# Patient Record
Sex: FEMALE | Race: WHITE | NOT HISPANIC OR LATINO | Employment: OTHER | URBAN - METROPOLITAN AREA
[De-identification: names, ages, dates, MRNs, and addresses within clinical notes are randomized per-mention and may not be internally consistent; named-entity substitution may affect disease eponyms.]

---

## 2017-05-17 DIAGNOSIS — I63.9 CVA (CEREBRAL VASCULAR ACCIDENT): Primary | ICD-10-CM

## 2017-05-29 ENCOUNTER — CLINICAL SUPPORT (OUTPATIENT)
Dept: REHABILITATION | Facility: HOSPITAL | Age: 77
End: 2017-05-29
Attending: INTERNAL MEDICINE
Payer: MEDICARE

## 2017-05-29 DIAGNOSIS — I69.398 ABNORMALITY OF GAIT AS LATE EFFECT OF CEREBROVASCULAR ACCIDENT (CVA): ICD-10-CM

## 2017-05-29 DIAGNOSIS — R26.89 IMPAIRED BALANCE AS LATE EFFECT OF CEREBROVASCULAR ACCIDENT: ICD-10-CM

## 2017-05-29 DIAGNOSIS — I69.398 IMPAIRED BALANCE AS LATE EFFECT OF CEREBROVASCULAR ACCIDENT: ICD-10-CM

## 2017-05-29 DIAGNOSIS — R26.9 ABNORMALITY OF GAIT AS LATE EFFECT OF CEREBROVASCULAR ACCIDENT (CVA): ICD-10-CM

## 2017-05-29 PROCEDURE — G8979 MOBILITY GOAL STATUS: HCPCS | Mod: CJ,PO

## 2017-05-29 PROCEDURE — G8978 MOBILITY CURRENT STATUS: HCPCS | Mod: CK,PO

## 2017-05-29 PROCEDURE — 97161 PT EVAL LOW COMPLEX 20 MIN: CPT | Mod: PO

## 2017-05-29 NOTE — PROGRESS NOTES
OUTPATIENT NEUROLOGICAL REHABILITATION  PHYSICAL THERAPY EVALUATION    Name: Alba Vasquez  Clinic Number: [id number]    Diagnosis:   Encounter Diagnoses   Name Primary?    Abnormality of gait as late effect of cerebrovascular accident (CVA)     Impaired balance as late effect of cerebrovascular accident      Physician: Garry Bennett MD  Treatment Orders: PT Eval and Treat  No past medical history on file.    Evaluation Date: 5/29/17  Visit #: 1  Plan of care expiration: 7/23/17  Precautions: standard; aphasic    History     Medical Diagnosis: s/p CVA   PT Diagnosis: impaired gait and balance s/p CVA    History of Present Illness: Alba is a 76 y.o. female that presents to [facility] secondary to CVA Feb 2017.     Chief complaints:  1. R side hemiparesis  2. R leg feels weak   3. Runs into walls on right side   4. Would like to start fitness program at [employer] if able after therapy if safe.  5. Speech impairment most limiting - aphasia      Falls in the past year: None  Prior Therapy: Home health PT immediately after CVA  Nutrition:  Normal  Social History/Support systems: Lives alone  Place of Residence (Steps/Adaptations/Levels): Has a flight of stairs but does not utilize upstairs  Previous functional status includes: Independent  Current functional status:  Tima  Exercise routine: Not doing HHPT HEP anymore  DME owned: none    Subjective   Pt stated goals: Resume prior life  Pain: 0/10    Objective   - Follows commands: 100% of time   - Speech: aphasia    Mental status: alert, oriented to person, place, and time  Appearance: Casually dressed  Behavior:  calm and cooperative  Attention Span and Concentration:  Normal    Dominant hand:  right     Posture Alignment in sitting: neutral  Posture Alignment in standing: neutral    Sensation: Light Touch: Intact         Tone: slight increase    Visual/Auditory: R eye hx cataract issues - seeing eye doctor this  month  Tracking:Intact  R/L discrimination: Intact  Visual field: Intact    Coordination:   - LE coordination:  Mild impairment    RANGE OF MOTION--LOWER EXTREMITIES  (R) LE Hip: normal   Knee: normal   Ankle: normal    (L) LE: Hip: normal   Knee: normal   Ankle: normal    Strength: manual muscle test grades below   Lower Extremity Strength  Right LE  Left LE    Hip Flexion: 4/5 Hip Flexion: 5/5   Hip Abduction: 5/5 Hip Abduction: 5/5   Hip Adduction: 5/5 Hip Adduction 5/5   Knee Extension: 5/5 Knee Extension: 5/5   Knee Flexion: 5/5 Knee Flexion: 5/5   Ankle Dorsiflexion: 5/5 Ankle Dorsiflexion: 5/5   Ankle Plantarflexion: 5/5 Ankle Plantarflexion: 5/5      Evaluation   Single Limb Stance R LE  5s  (<10 sec = HIGH FALL RISK)   Single Limb Stance L LE 5s  (<10 sec = HIGH FALL RISK)   30 second Chair Rise 10 completed with no arms     Gait Assessment:   - AD used: None  - Assistance: Tima  - Distance: 300+ feet     GAIT DEVIATIONS:  Alba displays the following deviations with ambulation: slowed gait  Impairments contributing to deviations: impaired motor control    Endurance Deficit: mild     Evaluation   Timed Up and Go 10 sec   Self Selected Walking Speed 1.0 m/sec (6m/6s)   ABC  96.3%     Functional Gait Assessment:   1. Gait on level surface =  3  2. Change in Gait Speed = 2  3. Gait with horizontal head turns  = 2  4. Gait with vertical head turns = 2  5. Gait with pivot turns = 3  6. Step over obstacle = 2  7. Gait with Narrow WILLY = 2  8. Gait with eyes closed = 2  9. Ambulating Backwards = 2  10. Steps = 2   Score 22/30     Functional Mobility (Bed mobility, transfers)  Bed mobility: I  Supine to sit: I  Sit to supine: I  Transfers to bed: I  Transfers to toilet: I  Sit to stand:  I  Stand pivot:  I  Car transfers: Mod I  Wheelchair mobility: n/a    Functional Limitations Reports - G Codes  Category: Mobility   Tool: CMS Impairment/Limitation/Restriction for FOTO Cerebrovascular Disorders Survey  Status  Limitation G-Code CMS Severity Modifier  Intake 60% 40% Current Status CK - At least 40 percent but less than 60 percent  Predicted 66% 34% Goal Status+ CJ - At least 20 percent but less than 40 percent          Education provided re:role of PT, goals for PT, scheduling - pt verbalized understanding. Discussed insurance limitations with pt.     Alba verbalized good understanding of education provided.   Pt has no cultural, educational or language barriers to learning provided.      Assessment   This is a 76 y.o. female referred to outpatient physical therapy and presents with a medical diagnosis of CVA.  Patient presents with mild R LE coordination impairments, balance impairments and endurance impairments s/p her CVA which is affecting her gait performance and activity tolerance for IADLs/community mobility. PT is warranted to address the deficits in order to return her to her PLOF. She is more affected by her R UE impairments for which she will be seeing OT later this week. She would also benefit from outpatient SLP which she was going to call her MD to request orders.      PT/PTA met face to face to discuss pt's treatment plan and established goals. Pt will be seen by physical therapy every 6th visit and minimally once per month.     Pt rehab potential is Good. Pt will benefit from continuing skilled outpatient physical therapy to address the deficits listed below in the problem list, provide pt/family education and to maximize pt's level of independence in the home and community environment.     History  Co-morbidities and personal factors that may impact the plan of care Examination  Body Structures and Functions, activity limitations and participation restrictions that may impact the plan of care. Medical necessity is demonstrated by the following impairments. Clinical Presentation Decision Making/ Complexity Score   1. Lives alone, minimal social  1. Impaired gait   2. Impaired balance  3. Impaired  coordination  4. L UE impairments  5. aphasia stable    moderate high low low       Pt's spiritual, cultural and educational needs considered and pt agreeable to plan of care and goals as stated below:     GOALS:   Short term goals: 4 weeks, pt agrees to goals set.  1. Pt will improve SLS time on R to at least 10 seconds for decreased fall risk.   2. Pt will improve SLS time on L to at least 10 seconds for decreased fall risk.   3. Pt will improve FGA score to at least 25/30 for increased safety with community ambulation.       Long term goals: 8 weeks, pt agrees to goals set  1. Pt will improve FGA score to at least 28/30 for increased safety with community ambulation.   2. Pt will improve SSWS to at least 1.2m/s for improved community ambulation.   3. Pt will improve FOTO score to at least 66% for improved self perception of functional mobility.      Plan   Outpatient physical therapy 1 time  weekly to include: Pt Education, HEP, therapeutic exercises, neuromuscular re-education, therapeutic activities, manual therapy, joint mobilizations, aquatic therapy and modalities PRN to achieve established goals. Pt may be seen by PTA as part of the rehabilitation team.     Cont PT for up to 8 weeks.     Michelle Haider, PT  05/29/2017

## 2017-05-29 NOTE — PLAN OF CARE
OUTPATIENT NEUROLOGICAL REHABILITATION  PHYSICAL THERAPY EVALUATION    Name: Alba Vasquez  Clinic Number: [id number]    Diagnosis:   Encounter Diagnoses   Name Primary?    Abnormality of gait as late effect of cerebrovascular accident (CVA)     Impaired balance as late effect of cerebrovascular accident      Physician: Garry Bennett MD  Treatment Orders: PT Eval and Treat  No past medical history on file.    Evaluation Date: 5/29/17  Visit #: 1  Plan of care expiration: 7/23/17  Precautions: standard; aphasic    History     Medical Diagnosis: s/p CVA   PT Diagnosis: impaired gait and balance s/p CVA    History of Present Illness: Alba is a 76 y.o. female that presents to [facility] secondary to CVA Feb 2017.     Chief complaints:  1. R side hemiparesis  2. R leg feels weak   3. Runs into walls on right side   4. Would like to start fitness program at [employer] if able after therapy if safe.  5. Speech impairment most limiting - aphasia      Falls in the past year: None  Prior Therapy: Home health PT immediately after CVA  Nutrition:  Normal  Social History/Support systems: Lives alone  Place of Residence (Steps/Adaptations/Levels): Has a flight of stairs but does not utilize upstairs  Previous functional status includes: Independent  Current functional status:  Tima  Exercise routine: Not doing HHPT HEP anymore  DME owned: none    Subjective   Pt stated goals: Resume prior life  Pain: 0/10    Objective   - Follows commands: 100% of time   - Speech: aphasia    Mental status: alert, oriented to person, place, and time  Appearance: Casually dressed  Behavior:  calm and cooperative  Attention Span and Concentration:  Normal    Dominant hand:  right     Posture Alignment in sitting: neutral  Posture Alignment in standing: neutral    Sensation: Light Touch: Intact         Tone: slight increase    Visual/Auditory: R eye hx cataract issues - seeing eye doctor this  month  Tracking:Intact  R/L discrimination: Intact  Visual field: Intact    Coordination:   - LE coordination:  Mild impairment    RANGE OF MOTION--LOWER EXTREMITIES  (R) LE Hip: normal   Knee: normal   Ankle: normal    (L) LE: Hip: normal   Knee: normal   Ankle: normal    Strength: manual muscle test grades below   Lower Extremity Strength  Right LE  Left LE    Hip Flexion: 4/5 Hip Flexion: 5/5   Hip Abduction: 5/5 Hip Abduction: 5/5   Hip Adduction: 5/5 Hip Adduction 5/5   Knee Extension: 5/5 Knee Extension: 5/5   Knee Flexion: 5/5 Knee Flexion: 5/5   Ankle Dorsiflexion: 5/5 Ankle Dorsiflexion: 5/5   Ankle Plantarflexion: 5/5 Ankle Plantarflexion: 5/5      Evaluation   Single Limb Stance R LE  5s  (<10 sec = HIGH FALL RISK)   Single Limb Stance L LE 5s  (<10 sec = HIGH FALL RISK)   30 second Chair Rise 10 completed with no arms     Gait Assessment:   - AD used: None  - Assistance: Tima  - Distance: 300+ feet     GAIT DEVIATIONS:  Alba displays the following deviations with ambulation: slowed gait  Impairments contributing to deviations: impaired motor control    Endurance Deficit: mild     Evaluation   Timed Up and Go 10 sec   Self Selected Walking Speed 1.0 m/sec (6m/6s)   ABC  96.3%     Functional Gait Assessment:   1. Gait on level surface =  3  2. Change in Gait Speed = 2  3. Gait with horizontal head turns  = 2  4. Gait with vertical head turns = 2  5. Gait with pivot turns = 3  6. Step over obstacle = 2  7. Gait with Narrow WILLY = 2  8. Gait with eyes closed = 2  9. Ambulating Backwards = 2  10. Steps = 2   Score 22/30     Functional Mobility (Bed mobility, transfers)  Bed mobility: I  Supine to sit: I  Sit to supine: I  Transfers to bed: I  Transfers to toilet: I  Sit to stand:  I  Stand pivot:  I  Car transfers: Mod I  Wheelchair mobility: n/a    Functional Limitations Reports - G Codes  Category: Mobility   Tool: CMS Impairment/Limitation/Restriction for FOTO Cerebrovascular Disorders Survey  Status  Limitation G-Code CMS Severity Modifier  Intake 60% 40% Current Status CK - At least 40 percent but less than 60 percent  Predicted 66% 34% Goal Status+ CJ - At least 20 percent but less than 40 percent          Education provided re:role of PT, goals for PT, scheduling - pt verbalized understanding. Discussed insurance limitations with pt.     Alba verbalized good understanding of education provided.   Pt has no cultural, educational or language barriers to learning provided.      Assessment   This is a 76 y.o. female referred to outpatient physical therapy and presents with a medical diagnosis of CVA.  Patient presents with mild R LE coordination impairments, balance impairments and endurance impairments s/p her CVA which is affecting her gait performance and activity tolerance for IADLs/community mobility. PT is warranted to address the deficits in order to return her to her PLOF. She is more affected by her R UE impairments for which she will be seeing OT later this week. She would also benefit from outpatient SLP which she was going to call her MD to request orders.      PT/PTA met face to face to discuss pt's treatment plan and established goals. Pt will be seen by physical therapy every 6th visit and minimally once per month.     Pt rehab potential is Good. Pt will benefit from continuing skilled outpatient physical therapy to address the deficits listed below in the problem list, provide pt/family education and to maximize pt's level of independence in the home and community environment.     History  Co-morbidities and personal factors that may impact the plan of care Examination  Body Structures and Functions, activity limitations and participation restrictions that may impact the plan of care. Medical necessity is demonstrated by the following impairments. Clinical Presentation Decision Making/ Complexity Score   1. Lives alone, minimal social  1. Impaired gait   2. Impaired balance  3. Impaired  coordination  4. L UE impairments  5. aphasia stable    moderate high low low       Pt's spiritual, cultural and educational needs considered and pt agreeable to plan of care and goals as stated below:     GOALS:   Short term goals: 4 weeks, pt agrees to goals set.  1. Pt will improve SLS time on R to at least 10 seconds for decreased fall risk.   2. Pt will improve SLS time on L to at least 10 seconds for decreased fall risk.   3. Pt will improve FGA score to at least 25/30 for increased safety with community ambulation.       Long term goals: 8 weeks, pt agrees to goals set  1. Pt will improve FGA score to at least 28/30 for increased safety with community ambulation.   2. Pt will improve SSWS to at least 1.2m/s for improved community ambulation.   3. Pt will improve FOTO score to at least 66% for improved self perception of functional mobility.      Plan   Outpatient physical therapy 1 time  weekly to include: Pt Education, HEP, therapeutic exercises, neuromuscular re-education, therapeutic activities, manual therapy, joint mobilizations, aquatic therapy and modalities PRN to achieve established goals. Pt may be seen by PTA as part of the rehabilitation team.     Cont PT for up to 8 weeks.     Michelle Haider, PT  05/29/2017

## 2017-05-31 ENCOUNTER — CLINICAL SUPPORT (OUTPATIENT)
Dept: REHABILITATION | Facility: HOSPITAL | Age: 77
End: 2017-05-31
Attending: INTERNAL MEDICINE
Payer: MEDICARE

## 2017-05-31 DIAGNOSIS — Z78.9 IMPAIRED MOBILITY AND ADLS: ICD-10-CM

## 2017-05-31 DIAGNOSIS — Z74.09 IMPAIRED MOBILITY AND ADLS: ICD-10-CM

## 2017-05-31 DIAGNOSIS — R29.898 DECREASED GRIP STRENGTH OF RIGHT HAND: Primary | ICD-10-CM

## 2017-05-31 DIAGNOSIS — R27.8 DECREASED COORDINATION: ICD-10-CM

## 2017-05-31 PROCEDURE — 97166 OT EVAL MOD COMPLEX 45 MIN: CPT | Mod: PO

## 2017-05-31 PROCEDURE — G8985 CARRY GOAL STATUS: HCPCS | Mod: CJ,PO

## 2017-05-31 PROCEDURE — G8984 CARRY CURRENT STATUS: HCPCS | Mod: CK,PO

## 2017-05-31 NOTE — PROGRESS NOTES
Name: Alba Vasquez  MRN: [id number]   Physician: Garry Bennett MD     Diagnosis:   Encounter Diagnosis   Name Primary?    Impaired mobility and ADLs         Onset date: Feb 2017    Date of service: 05/31/17  Start time: 0900  End time: 0950  Visit: 1  Orders: Eval and Treat    Subjective:  Patient goals: improve handwriting,eating with RUE, overall strength of RUE, and UE dressing   Chief Complaint: speech, RUE weakness, difficulties with eating, limitations in handwriting      Chief Complaint   Patient presents with    OT Initial Evaluation      No past medical history on file.   No current outpatient prescriptions on file.     No current facility-administered medications for this visit.      Precautions: none  Social Hx: Retired ,  lives alone    DME: hand rails in bathroom that were previously installed for     Home access: lives in a home, no stairs for entrance, two story house but lives on the first floor.    Review of patient's allergies indicates:  Allergies not on file    Past treatment includes: HHPT    Precautions:  Pain: 0/10 at rest. 0/10 with movement. Pain established using the Numbers pain scale.   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 [facility]  Job description includes: tutoring at local schools  Driving status: currently driving    Objective  Cognitive Exam  Oriented: Person, Place, Time and Situation  Behaviors: pleasant, cooperative  Follows Commands/attention: Follows multistep  commands  Communication: expressive aphasia, mild receptive aphasia, difficulty with word finding   Memory: intact   Safety awareness/insight to disability: aware of disability  Coping skills/emotional control: Appropriate to situation    Visual/perceptual: cataract surgery last year in R eye  Tracking: intact  Saccades:  intact  Acuity: wears reading glasses  R/L discrimination: intact  Visual field: intact  Motor Planning Praxis: intact  Comments: cataract surgery last year in R eye, Pt reports additional procedure to take place in R eye in future    Physical Exam:    Postural examination/scapula alignment: B Rounded shoulder  Joint integrity: firm end feel  Skin integrity: intact  Edema: none    Sensation: Alba reports periodic numbness in L hand.  Light touch: intact  Sharp/Dull:  intact  Kinesthesia: intact  Proprioception:intact  Temperature: intact    Joint Evaluation AROM  PROM     Left Right Left Right   Shoulder flex 0-180 WFL WFL WFL WFL   Shoulder Abd 0-180 WFL WFL WFL WFL   Shoulder ER 0-90 WFL WFL WFL WFL   Shoulder IR 0-90 WFL WFL WFL WFL   Shoulder Extension 0-80 WFL WFL WFL WFL   Shoulder Horizontal adduction 0-90 WFL WFL WFL WFL   Elbow flex/ext 0-150 WFL WFL WFL WFL   Wrist flex 0-80 WFL WFL WFL WFL   Wrist ext 0-70 WFL WFL WFL WFL   Supination 0-80 WFL WFL WFL WFL   Pronation 0-80 WFL WFL WFL WFL   UD WFL WFL WFL WFL   RD WFL WFL WFL WFL     Fist: able to make fist with B hands, but R hand limited in ROM due to arthritic fingers    Comments: bradykinesia in R hand for fine motor coordination    Strength      Left Right   Shoulder flex G G-   Shoulder abd G G-   Shoulder ER G G-   Shoulder IR G G-   Shoulder Extension G G-   Shoulder Horizontal adduction G G-   Elbow flex G G-   Elbow ext G G-   Wrist flex G G-   Wrist ext G G-   Supination G G-   Pronation G G-   UD G G-   RD G G-        Strength(lbs): R: 25 , 30, 28 = 27.7#   L 60, 55, 42 = 52.3#    Fine motor coordination:  9 hole peg  R  54 sec   L 28 sec    Gross motor coordination:slow and decreased accuracy RUE    Tone:  Modified Bryan Scale:   0 - No increase in muscle tone    Balance:   Static: good  Dynamic sit: good                                 Functional Mobility: Pt reports some difficulty with R hip mobility in bed  Bed mobility: Mod  I  Roll to left: Mod I  Roll to right: Mod I  Supine to sit: Mod I  Sit to supine: Mod I  Transfers to bed: Mod I  Transfers to toilet: Mod I  Car transfers: Mod I      ADL's:  Feeding: Mod I - uses L hand more than R, frustrated with use of R hand, difficulty opening cans( has a manual can opener)  Grooming: Mod I, uses L hand to reach back of head  Hygiene: Mod I  UB Dressing: Mod I, hook in front and turn around for bra  LB Dressing: Mod I   Toileting: Mod I - use of L hand more than R.  Bathing: Mod I - standing, hand rails in shower     IADL's:  Homecare: Mod I - has someone who comes in once a month  Cooking: Mod I - holds heavy pots with L hand  Laundry: Mod I  Yard work: N/A  Use of telephone: Mod I - difficulty with accuracy of touching the screen  Money management: Mod A- daughter help complete bills because Pt unable to sign name  Medication management: Mod I  Handwriting: D - unable to sign name on checks    Comments: Pt favoring use of LUE due to limitations in functional abilities with RUE    G-Code: Approx. 50% impaired  strength and fine motor coordination.  CK carry    TODAY'S TREATMENT    Teatment today included: OT Eval    ASSESSMENT:  OT diagnosis: impaired mobility and ADLs, decreased strength in RUE, impaired speech    Assessment  Alba Vasquez is a 76 y.o. female with a medical diagnosis of CVA in February 2017. Alba was referred to [facility] for functional limitations and impairments in ADLs and IADLs. At time of eval Alba presents with decreased strength, decreased fine and gross motor coordination, and decreased endurance and stamina in RUE, as well as speech impairments.  These decficits/problems have resulted in occupational limitations including, but not limited to, UE dressing, feeding, IADLs, and community participation.  Ms. Vasquez can benefit from outpatient occupational therapy and a home program to address the stated goals to improve impairments and  functional limitations. Treatment will be directed at improve the following impairments. Rehab potential is good.  Encounter Diagnosis   Name Primary?    Impaired mobility and ADLs      PROBLEM LIST/IMPAIRMENTS:   decreased muscle strength, decreased fine and gross motor coordination, and decreased endurance and stamina affecting RUE in ADLs and IADL tasks.    LTG GOALS:  Time frame: 10 weeks  Increase ROM/Strength to perform ADL's and functional activities for UE dressing and feeding  Improve 9 hole peg test by 25 sec with R hand for fastners in dressing  Use of RUE 75% of time for feeding tasks  Legible Signature  Increase R  strength 10# for holding feeding and grooming tools.  G-Code: CJ Carry 20-40% impairment    STG Goals:  Time frame: 5 weeks  Improve 9 hole peg test by 10 sec in R hand  Use of RUE 50% of time for feeding tasks  I in HEP for increased strength of RUE and improved handwriting  Achieve baseline data for speed and accuracy of texting      PLAN:    Patient/caregiver understands and agrees with plan of care.  History Examination Decision Making Complexity Score   Occupational Profile: expanded chart review and personal interview    Medical and Therapy History: CVA, severe OA of the hands,aphasia                 Performance Deficits     Physical: decreased strength in RUE, decreased endurance and stamina, decreased fine and gross coordination RUE      Cognitive  Expressive and receptive aphasia      Psychosocial:  Lives alone, limited social interactions       Clinical decision making of moderate complexity, which includes an analysis of the occupational profile, analysis of date from problem focused assessments, and the consideration of several treatment options. Patient presents with comorbidities that affect occupational performance. Minimal to moderate modifications of tasks or assistance with assessments is necessary to enable completion of evaluation component.  Moderate            Outpatient occupational therapy 2  times weekly for 10 weeks  to include: pt ed, hep, therapeutic exercises, therapeutic activity, ADLs,  neuromuscular re-education, joint mobilizations, modalities prn, certification 5/30/17- 8/11/17    .   Treatment Time: 50 minutes    MIMI Starks  I certify that I was present in the room directing the student in service delivery and guiding them using my skilled judgment. As the co-signing therapist I have reviewed the students documentation and am responsible for the treatment, assessment, and plan.   Mary Stone, OT

## 2017-06-02 DIAGNOSIS — I63.9 CVA (CEREBROVASCULAR ACCIDENT): Primary | ICD-10-CM

## 2017-06-02 DIAGNOSIS — R47.81 SLURRED SPEECH: ICD-10-CM

## 2017-06-05 ENCOUNTER — CLINICAL SUPPORT (OUTPATIENT)
Dept: REHABILITATION | Facility: HOSPITAL | Age: 77
End: 2017-06-05
Attending: INTERNAL MEDICINE
Payer: MEDICARE

## 2017-06-05 DIAGNOSIS — I63.9 CEREBROVASCULAR ACCIDENT (STROKE): ICD-10-CM

## 2017-06-05 DIAGNOSIS — I69.398 ABNORMALITY OF GAIT AS LATE EFFECT OF CEREBROVASCULAR ACCIDENT (CVA): ICD-10-CM

## 2017-06-05 DIAGNOSIS — I69.398 IMPAIRED BALANCE AS LATE EFFECT OF CEREBROVASCULAR ACCIDENT: ICD-10-CM

## 2017-06-05 DIAGNOSIS — Z74.09 IMPAIRED MOBILITY AND ADLS: ICD-10-CM

## 2017-06-05 DIAGNOSIS — R26.9 ABNORMALITY OF GAIT AS LATE EFFECT OF CEREBROVASCULAR ACCIDENT (CVA): ICD-10-CM

## 2017-06-05 DIAGNOSIS — R26.89 IMPAIRED BALANCE AS LATE EFFECT OF CEREBROVASCULAR ACCIDENT: ICD-10-CM

## 2017-06-05 DIAGNOSIS — R47.81 SLURRED SPEECH: Primary | ICD-10-CM

## 2017-06-05 DIAGNOSIS — Z78.9 IMPAIRED MOBILITY AND ADLS: ICD-10-CM

## 2017-06-05 PROCEDURE — 97112 NEUROMUSCULAR REEDUCATION: CPT | Mod: PO

## 2017-06-05 PROCEDURE — 97530 THERAPEUTIC ACTIVITIES: CPT | Mod: PO

## 2017-06-05 PROCEDURE — 97110 THERAPEUTIC EXERCISES: CPT | Mod: PO

## 2017-06-05 NOTE — PATIENT INSTRUCTIONS
"  Ankle Plantar Flexion / Dorsiflexion, Standing        Stand while holding a stable object.    Rise up on toes x 20 reps.   Then rock back on heels x 20 reps.    Repeat 2 rounds of 20 each. 1-2 sessions per day.  (If this becomes easy, start to try heel raises and toe raises without holding but have support within reach.)        Hip Side leg Lift        Holding a chair for balance if needed, keep legs shoulder width apart and toes pointed forward. Swing a leg out to side, keeping knee straight. Do not lean. Repeat using other leg.  Repeat 10 times. Do 2 sets. 1-2 sessions per day.    Single leg balance with support in reach        Stand on one leg in neutral spine without support. Hold up to 30 seconds.  Repeat on other leg.  Do 3-5 repetitions.  1-2 sessions per day.     [email or web address]         Feet Heel-Toe "Tandem", Varied Arm Positions         With eyes open, right foot directly in front of the other, arms out, look straight ahead at a stationary object. Hold up to 30 seconds.  Repeat with left foot in front. Can change arm position for varied difficulty.   Do 3-5 repetitions.  1-2 sessions per day.    Copyright © [employer]. All rights reserved.        HIP: Hamstrings - Short Sitting        Rest leg on raised surface. Keep knee straight. Lift chest and lean forwards towards toes. Hold 30 seconds.  Repeat 3 times. 1-2 sessions per day.     Copyright © [employer]. All rights reserved.            "

## 2017-06-05 NOTE — PROGRESS NOTES
Physical Therapy Progress Note     Name: Alba [facility] Number: [id number]  Diagnosis:   Encounter Diagnoses   Name Primary?    Abnormality of gait as late effect of cerebrovascular accident (CVA)     Impaired balance as late effect of cerebrovascular accident      Physician: Garry Bennett MD  Treatment Orders: PT Eval and Treat  No past medical history on file.    Evaluation Date: 5/29/17  Visit #: 2  Plan of care expiration: 7/23/17  Precautions: standard; aphasic    G codes 2/10    FOTO On ST caseload         Subjective   Pt reports: no new complaints today.   Pain Scale:  None reported    Objective     Patient received individual therapy with activities as follows:     Alba received therapeutic exercises to develop strength and flexibility for 25 minutes including:   Recumbent bike x 8 min, L12 no rest breaks needed. Pt reported medium difficulty.   Heel cord stretches at incline board, 3 x 30 sec B  HS stretches at stairs, 3 x 30 sec B  Standing hip abductions 3 x 10 B, 2 HR  HS curls x 10 B - fair posture  Mini squats x 10 - difficulty keeping instructed posture (leans forwards)    Patient participated in neuromuscular re-education activities to improve: Balance and Coordination for 20 minutes. The following activities were included:   Tandem static stance x 30 sec B - no loss of balance  Heel raises x 20, no HR  Toe raises, 2 x 20, unable with no HR, needed 2 fingers on bar for balance  Tandem walking, intermittent HR needed with mild LOB, x 3 laps  SLS trials 3 x 30 sec trials but intermittent HR needed for balance      Written Home Exercises: heel raises, toe raises, HS stretch, SLS, tandem stance, hip abductions.  (see pt instructions 6/5/17)  Pt demo good understanding of the education provided. Alba demonstrated good return demonstration of activities.     Education provided re: POC, HEP  No spiritual or educational barriers to learning  provided    Pt has no cultural, educational or language barriers to learning provided.    Assessment   Pt tolerated her initial follow up session well. She did well with both strength and balance exercises. She reported most difficulty with the hip abductions for fatigue in the muscles. Her initial HEP was developed and she showed good understanding. Continue tx.         Pt rehab potential is Good. Pt will benefit from continuing skilled outpatient physical therapy to address the deficits listed below in the problem list, provide pt/family education and to maximize pt's level of independence in the home and community environment.      History  Co-morbidities and personal factors that may impact the plan of care Examination  Body Structures and Functions, activity limitations and participation restrictions that may impact the plan of care. Medical necessity is demonstrated by the following impairments. Clinical Presentation Decision Making/ Complexity Score   1. Lives alone, minimal social  1. Impaired gait   2. Impaired balance  3. Impaired coordination  4. L UE impairments  5. aphasia stable     moderate high low low         Pt's spiritual, cultural and educational needs considered and pt agreeable to plan of care and goals as stated below:      GOALS:   Short term goals: 4 weeks, pt agrees to goals set.  1. Pt will improve SLS time on R to at least 10 seconds for decreased fall risk.   2. Pt will improve SLS time on L to at least 10 seconds for decreased fall risk.   3. Pt will improve FGA score to at least 25/30 for increased safety with community ambulation.         Long term goals: 8 weeks, pt agrees to goals set  1. Pt will improve FGA score to at least 28/30 for increased safety with community ambulation.   2. Pt will improve SSWS to at least 1.2m/s for improved community ambulation.   3. Pt will improve FOTO score to at least 66% for improved self perception of functional mobility.         Plan   Continue PT  1x weekly under established Plan of Care, with treatment to include: pt education, HEP, therapeutic exercises, neuromuscular re-education/balance exercises, therapeutic activities, joint mobilizations, and modalities PRN, to work towards established goals. Pt may be seen by PTA to carry out plan of care.     Michelle Haider, PT   06/05/2017

## 2017-06-05 NOTE — PROGRESS NOTES
"Patient:  Alba [facility] #:  [id number]   Date of Note: 06/05/2017   Referring Physician:  Garry Bennett MD  Diagnosis:    Encounter Diagnosis   Name Primary?    Impaired mobility and ADLs         Start Time: 0945  End Time: 1030   Total Time: 45min, 30 min one on one  Visit: 2    Subjective: Pt reported improvement in ability to write and stated, "I wrote a check and it went through."  Pain: 0 out of 10     Objective: Patient seen by OT this session. Treatment  consist of the following:  Therapeutic activities green digigrip individual and composite x 2 min each and pinching/grasping while searching for beans x 15 in pink putty to increase  and pinch strength.  Fine motor coordination with small peg tasks using tweezers to pull out pegs with R UE. Gross motor coordination task using R UE while standing to complete large PVC pipe ax.  Pt. Received HEP for handwriting and began "clock climbers" during session to demonstrate understanding of instructions.  Assessment:  Pt. Demos decreased strength and endurance in R UE during therapeutic activities.  Pt required Min verbal cueing to encourage use of R UE during tasks.  Pt motivated to complete HEP for handwriting to increase independence in money management.  Pt will continue to benefit from skilled OT intervention.    Patient continues to demonstrate limitation with  PROBLEM LIST/IMPAIRMENTS:   decreased muscle strength, decreased fine and gross motor coordination, and decreased endurance and stamina affecting RUE in ADLs and IADL tasks.     LTG GOALS:  Time frame: 10 weeks  Increase ROM/Strength to perform ADL's and functional activities for UE dressing and feeding  Improve 9 hole peg test by 25 sec with R hand for fastners in dressing  Use of RUE 75% of time for feeding tasks  Legible Signature  Increase R  strength 10# for holding feeding and grooming tools.  G-Code: CJ Carry 20-40% impairment     STG Goals:  Time frame: 5 weeks  Improve 9 hole peg " test by 10 sec in R hand  Use of RUE 50% of time for feeding tasks  I in HEP for increased strength of RUE and improved handwriting  Achieve baseline data for speed and accuracy of texting    Plan: Outpatient occupational therapy 2 times weekly for 10 weeks  to include: pt ed, hep, therapeutic exercises, therapeutic activity, ADLs,  neuromuscular re-education, joint mobilizations, modalities prn, certification 5/30/17- 8/11/17    FOTO    CMS Impairment/Limitation/Restriction for FOTO Cerebrovascular Disorders Survey  Status Limitation G-Code CMS Severity Modifier  Intake 56% 44% Current Status CK - At least 40 percent but less than 60 percent  Predicted 69% 31% Goal Status+ CJ - At least 20 percent but less than 40 percent  CMS Impairment/Limitation/Restriction for Stroke IS Physical - Hand Function Survey  Status Limitation G-Code CMS Severity Modifier  Intake 60% 40% Current Status CK - At least 40 percent but less than 60 percent        Barb Santo, OTS  I certify that I was present in the room directing the student in service delivery and guiding them using my skilled judgment. As the co-signing therapist I have reviewed the students documentation and am responsible for the treatment, assessment, and plan.     Mary Stone, OT

## 2017-06-12 ENCOUNTER — CLINICAL SUPPORT (OUTPATIENT)
Dept: REHABILITATION | Facility: HOSPITAL | Age: 77
End: 2017-06-12
Attending: INTERNAL MEDICINE
Payer: MEDICARE

## 2017-06-12 DIAGNOSIS — I69.398 IMPAIRED BALANCE AS LATE EFFECT OF CEREBROVASCULAR ACCIDENT: Primary | ICD-10-CM

## 2017-06-12 DIAGNOSIS — I69.398 ABNORMALITY OF GAIT AS LATE EFFECT OF CEREBROVASCULAR ACCIDENT (CVA): ICD-10-CM

## 2017-06-12 DIAGNOSIS — R26.89 IMPAIRED BALANCE AS LATE EFFECT OF CEREBROVASCULAR ACCIDENT: Primary | ICD-10-CM

## 2017-06-12 DIAGNOSIS — R26.89 IMPAIRED BALANCE AS LATE EFFECT OF CEREBROVASCULAR ACCIDENT: ICD-10-CM

## 2017-06-12 DIAGNOSIS — I69.398 IMPAIRED BALANCE AS LATE EFFECT OF CEREBROVASCULAR ACCIDENT: ICD-10-CM

## 2017-06-12 DIAGNOSIS — R26.9 ABNORMALITY OF GAIT AS LATE EFFECT OF CEREBROVASCULAR ACCIDENT (CVA): ICD-10-CM

## 2017-06-12 DIAGNOSIS — Z74.09 IMPAIRED MOBILITY AND ADLS: ICD-10-CM

## 2017-06-12 DIAGNOSIS — Z78.9 IMPAIRED MOBILITY AND ADLS: ICD-10-CM

## 2017-06-12 PROCEDURE — 97530 THERAPEUTIC ACTIVITIES: CPT | Mod: PO

## 2017-06-12 PROCEDURE — 97110 THERAPEUTIC EXERCISES: CPT | Mod: PO

## 2017-06-12 NOTE — PROGRESS NOTES
Physical Therapy Progress Note       Entered in ERROR. Pt did not realize that she had PT today after OT and left prior to PT session.

## 2017-06-12 NOTE — PROGRESS NOTES
"Patient:  Alba [facility] #:  [id number]   Date of Note: 06/12/2017   Referring Physician:  Garry Bennett MD  Diagnosis:    Encounter Diagnosis   Name Primary?    Impaired mobility and ADLs         Start Time: 1030  End Time: 1115   Total Time: 45min, 30 min one on one  Visit: 3    Subjective: Pt reported improvement in ability to hold eating utensils and stated she was about to successfully use R UE to paint a picture this weekend.  Pain: 0 out of 10     Objective: Patient seen by OT this session. Treatment  consist of the following therapeutic exercises and activities:  Ergometer 5 min forward and 5 min reverse to increase stamina and endurance of B UE. R hand/wrist strengthening using 2# while performing flexion/extension, pronation/supination, and radial/ulnar deviation.  Fine motor coordination with in hand manipulation while picking up and releasing marbles, unscrewing/screwing washers x 5, and picking up dabloons x 20.  Pt received "kite strings" phase of handwriting HEP and completed 5 min of exercises to demonstrate understanding of instructions.  Assessment:  Pt demo increased fine motor coordination during session but still maintains deficits with strength and endurance in R UE during activities.  Pt required Min verbal cueing to encourage use of R UE during tasks.  Pt motivated to complete HEP for handwriting to increase independence in money management.  Pt will continue to benefit from skilled OT intervention.  Patient continues to demonstrate limitation with  PROBLEM LIST/IMPAIRMENTS:   decreased muscle strength, decreased fine and gross motor coordination, and decreased endurance and stamina affecting RUE in ADLs and IADL tasks.     LTG GOALS:  Time frame: 10 weeks  Increase ROM/Strength to perform ADL's and functional activities for UE dressing and feeding  Improve 9 hole peg test by 25 sec with R hand for fastners in dressing  Use of RUE 75% of time for feeding tasks  Legible " Signature  Increase R  strength 10# for holding feeding and grooming tools.  G-Code: CJ Carry 20-40% impairment     STG Goals:  Time frame: 5 weeks  Improve 9 hole peg test by 10 sec in R hand  Use of RUE 50% of time for feeding tasks  I in HEP for increased strength of RUE and improved handwriting  Achieve baseline data for speed and accuracy of texting    Plan: Outpatient occupational therapy 2 times weekly for 10 weeks  to include: pt ed, hep, therapeutic exercises, therapeutic activity, ADLs,  neuromuscular re-education, joint mobilizations, modalities prn, certification 5/30/17- 8/11/17    Barb Santo, MIMI  I certify that I was present in the room directing the student in service delivery and guiding them using my skilled judgment. As the co-signing therapist I have reviewed the students documentation and am responsible for the treatment, assessment, and plan.     Mary Stone, OT

## 2017-06-13 ENCOUNTER — CLINICAL SUPPORT (OUTPATIENT)
Dept: REHABILITATION | Facility: HOSPITAL | Age: 77
End: 2017-06-13
Attending: INTERNAL MEDICINE
Payer: MEDICARE

## 2017-06-13 DIAGNOSIS — R47.01 ANOMIC APHASIA: ICD-10-CM

## 2017-06-13 DIAGNOSIS — I69.322 DYSARTHRIA AS LATE EFFECT OF CEREBROVASCULAR ACCIDENT (CVA): ICD-10-CM

## 2017-06-13 PROCEDURE — G9186 MOTOR SPEECH GOAL STATUS: HCPCS | Mod: CI,PO

## 2017-06-13 PROCEDURE — 92507 TX SP LANG VOICE COMM INDIV: CPT | Mod: PO

## 2017-06-13 PROCEDURE — G8999 MOTOR SPEECH CURRENT STATUS: HCPCS | Mod: CJ,PO

## 2017-06-13 NOTE — PROGRESS NOTES
"Date: 06/13/2017    Start Time:  1345  Stop Time:  1430      OUTPATIENT NEUROLOGICAL REHABILITATION  SPEECH THERAPY EVALUATION    Subjective/History  Onset Date:  February 2017  Primary Diagnosis:  L CVA  Treatment Diagnosis:  Anomic Aphasia, Dysarthria  Referring Provider:  Dr. Garry Jennings  Orders: ST for evaluation and treat  Current Medical History:   Alba Vasquez presents to the [facility] secondary to the diagnosis of L CVA. Pt reports that she was admitted to [facility] for approximately 3 days. Upon discharge, she received home health PT, OT, and ST for approximately 3-4 months.   Past Medical History: No past medical history on file.  Precautions:  fall  Prior Therapy:  HH ST  Pain:   0/10  Nutrition:  Regular diet, thin liquids  Environmental Concerns/Cultural/Spiritual/Developmental/Educational Needs: none  Prior Level of Function: Independent  Social History:  Mrs. Vasquez worked for [employer] as an  prior to her long-term. She reports that she is very organized and that language and communication have always come naturally to her.   Signs of Abuse: No  Functional Deficits Leading to Referral/Nature of Injury:  Word finding difficulty, slurred speech  Patient Therapy Goals:  "speak fluently again"    Objective   Western Aphasia Battery - Revised (WAB-R) was administered to evaluate the patient's receptive and expressive language function. The following results were revealed:    Spontaneous Speech Score: 18 / 20  Auditory Comprehension Score: 9.9 / 10  Repetition Score:   9 /10  Naming and Word Finding Score:  8.1 / 10  Aphasia Quotient (AQ):   90  Aphasia Classification:   Anomic Aphasia    Auditory Comprehension: Mrs. Carusos auditory comprehension is considered WFL. She was able to answer y/n questions of all complexity levels, identify objects and pictures presented to her, and follow " "complex commands. She was able to participate in conversation easily and reported no concerns with her comprehension.     Reading Comprehension: Not formally assessed. Will assess at a future session.     Verbal Expression: Mrs. Fermin verbal expression is considered mildly impaired. She was observed to grope for words when experiencing word finding difficulty. Intermittently, she repeated the first letter or syllable of a word she was trying to find. Most often, she paused significantly before finding the word or saying "I don't know". She was able to name 10 items in a category when completing a word fluency task (norm 15-20) indicating that her word fluency and thought organization is considered impaired. She was able to label 17/20 objects presented with mild groping for words observed. She completed responsive naming and sentence completion tasks with 100% accuracy. Her anomic episodes increase as her utterance length increases and affects her in  conversation most significantly.     Written Expression: Not formally assessed. Will assess at a future session.     Cognition: Not formally assessed. WFL for the purpose of conversation and assessment. No concerns reported.     Motor Speech/Fluency/Voice:  Mrs. Vasquez presents with mild dysarthria c/b imprecise consonants, slow rate of speech, and mild hypernasality. She was approximately 75-80% intelligible with careful listening. She reports that her intelligibility significantly decreases when she is tired. Fluency and voice were subjectively judged to be WFL.     Oral motor exam revealed:    Lingual: deviation upon protrusion to the right side observed. Decreased ROM to R side upon lateralization. ROM improved with verbal cues. Weakness upon protrusion and lateralization. Adequate tongue tip elevation for speech and eating observed.   Labial: decreased ROM on right side upon protrusion and retraction. Slight discoordination when transitioning from protrusion to " "retraction (oo-ee). Adequate strength upon protrusion observed.   Palatal: decreased ROM observed upon repetition of vowel "ah".   Buccal: Adequate for the production of speech and eating. No facial droop or weakness observed.   Diadochokinetic (DDK) rates for rapid repetition of 1 - 3 syllable utterances were not WNL.     Swallowing: No concerns reported. S/s of aspiration reviewed with pt. Pt verbalized understanding.     Hearing: No concerns reported. Will monitor for changes.     Assessment/Impressions    Mrs. Vasuqez presents to [facility] 2[facility] a L CVA. She presents with mild anomic aphasia c/b decreased word finding in conversation and when presented with items. Decreased word fluency was observed. She presents with mild-moderate dysarthria c/b imprecise consonants, slow rate of speech, and mild hypernasality. Speech intelligibility is judged to be approximately 75-80% with careful listening.  Patient will benefit from outpatient neurological rehabilitation speech therapy.    Functional Communication Measure (FCM):   Severity Modifier for Medicare G-Code:  Motor Speech  Current status: FCM: Level 5  - CJ   Projected status:  FCM: Level 6  - CI       Spoken Language Expression  Current status: FCM:Level 6  -  CI   Projected status:  FCM:  Level 7  -           Rehab Potential: good    Short Term Goals (4 weeks):   1. Pt will name 15-20 items in a concrete category to improve thought organization and word fluency across 3 consecutive sessions.   2. Pt will complete word finding tasks with 90% accuracy supervision across 3 consecutive sessions.  3. Pt will complete verbal expression tasks with 90% accuracy supervision across 3 consecutive sessions.   4. Pt will participate in an assessment of reading and writing.   5. Pt will complete oral motor exercises 10-20 per session to improve oral musculature strength and ROM.  6. Pt will utilize motor speech strategies (i.e. " Overarticulation, slow rate of speech, appropriate breath-speech coordination) in speech tasks with 90% accuracy supervision across 3 consecutive sessions.     Long Term Goals (8 weeks):   1. Pt will develop functional, cognitive-linguistic based skills and utilize compensatory strategies to communicate wants and needs effectively to different conversational partners, maintain safety, and participate socially in functional living environment.   2. Pt will develop functional and intelligible speech and utilize compensatory strategies to communicate effectively in functional living environment.     Education: POC was discussed with pt. Patient and family members expressed understanding.     Plan  Certification Period: 6/5/17 to 12/31/17  Plan of Care Certification Period: 6/13/17 to 8/11/17    Recommended Treatment Plan:  Patient will participate in the [facility] program for speech therapy 2 times per week to address her  Communication and Motor Speech deficits, to educate patient and their family, and to participate in a home exercise program.    Other Recommendations: none at this time      Therapist's Name: ARON Peterson, CCC-SLP     Date: 06/13/2017    I certify the need for these services furnished under this plan of treatment and while under my care.  ____________________________________ Physician/Referring Practitioner   Date of Signature

## 2017-06-15 ENCOUNTER — CLINICAL SUPPORT (OUTPATIENT)
Dept: REHABILITATION | Facility: HOSPITAL | Age: 77
End: 2017-06-15
Attending: INTERNAL MEDICINE
Payer: MEDICARE

## 2017-06-15 DIAGNOSIS — Z74.09 IMPAIRED MOBILITY AND ADLS: ICD-10-CM

## 2017-06-15 DIAGNOSIS — R29.898 POOR FINE MOTOR SKILLS: Primary | ICD-10-CM

## 2017-06-15 DIAGNOSIS — Z78.9 IMPAIRED MOBILITY AND ADLS: ICD-10-CM

## 2017-06-15 PROBLEM — G81.90 HEMIPLEGIA: Status: ACTIVE | Noted: 2017-06-15

## 2017-06-15 PROCEDURE — 97530 THERAPEUTIC ACTIVITIES: CPT | Mod: PO

## 2017-06-15 PROCEDURE — 97110 THERAPEUTIC EXERCISES: CPT | Mod: PO

## 2017-06-15 NOTE — PLAN OF CARE
"Date: 06/13/2017    Start Time:  1345  Stop Time:  1430      OUTPATIENT NEUROLOGICAL REHABILITATION  SPEECH THERAPY EVALUATION    Subjective/History  Onset Date:  February 2017  Primary Diagnosis:  L CVA  Treatment Diagnosis:  Anomic Aphasia, Dysarthria  Referring Provider:  Dr. Garry Jennings  Orders: ST for evaluation and treat  Current Medical History:   Alba Vasquez presents to the [facility] secondary to the diagnosis of L CVA. Pt reports that she was admitted to [facility] for approximately 3 days. Upon discharge, she received home health PT, OT, and ST for approximately 3-4 months.   Past Medical History: No past medical history on file.  Precautions:  fall  Prior Therapy:  HH ST  Pain:   0/10  Nutrition:  Regular diet, thin liquids  Environmental Concerns/Cultural/Spiritual/Developmental/Educational Needs: none  Prior Level of Function: Independent  Social History:  Mrs. Vasquez worked for [employer] as an  prior to her longterm. She reports that she is very organized and that language and communication have always come naturally to her.   Signs of Abuse: No  Functional Deficits Leading to Referral/Nature of Injury:  Word finding difficulty, slurred speech  Patient Therapy Goals:  "speak fluently again"    Objective   Western Aphasia Battery - Revised (WAB-R) was administered to evaluate the patient's receptive and expressive language function. The following results were revealed:    Spontaneous Speech Score: 18 / 20  Auditory Comprehension Score: 9.9 / 10  Repetition Score:   9 /10  Naming and Word Finding Score:  8.1 / 10  Aphasia Quotient (AQ):   90  Aphasia Classification:   Anomic Aphasia    Auditory Comprehension: Mrs. Carusos auditory comprehension is considered WFL. She was able to answer y/n questions of all complexity levels, identify objects and pictures presented to her, and follow " "complex commands. She was able to participate in conversation easily and reported no concerns with her comprehension.     Reading Comprehension: Not formally assessed. Will assess at a future session.     Verbal Expression: Mrs. Fermin verbal expression is considered mildly impaired. She was observed to grope for words when experiencing word finding difficulty. Intermittently, she repeated the first letter or syllable of a word she was trying to find. Most often, she paused significantly before finding the word or saying "I don't know". She was able to name 10 items in a category when completing a word fluency task (norm 15-20) indicating that her word fluency and thought organization is considered impaired. She was able to label 17/20 objects presented with mild groping for words observed. She completed responsive naming and sentence completion tasks with 100% accuracy. Her anomic episodes increase as her utterance length increases and affects her in  conversation most significantly.     Written Expression: Not formally assessed. Will assess at a future session.     Cognition: Not formally assessed. WFL for the purpose of conversation and assessment. No concerns reported.     Motor Speech/Fluency/Voice:  Mrs. Vasquez presents with mild dysarthria c/b imprecise consonants, slow rate of speech, and mild hypernasality. She was approximately 75-80% intelligible with careful listening. She reports that her intelligibility significantly decreases when she is tired. Fluency and voice were subjectively judged to be WFL.     Oral motor exam revealed:    Lingual: deviation upon protrusion to the right side observed. Decreased ROM to R side upon lateralization. ROM improved with verbal cues. Weakness upon protrusion and lateralization. Adequate tongue tip elevation for speech and eating observed.   Labial: decreased ROM on right side upon protrusion and retraction. Slight discoordination when transitioning from protrusion to " "retraction (oo-ee). Adequate strength upon protrusion observed.   Palatal: decreased ROM observed upon repetition of vowel "ah".   Buccal: Adequate for the production of speech and eating. No facial droop or weakness observed.   Diadochokinetic (DDK) rates for rapid repetition of 1 - 3 syllable utterances were not WNL.     Swallowing: No concerns reported. S/s of aspiration reviewed with pt. Pt verbalized understanding.     Hearing: No concerns reported. Will monitor for changes.     Assessment/Impressions    Mrs. Vasquez presents to [facility] 2/2 a L CVA. She presents with mild anomic aphasia c/b decreased word finding in conversation and when presented with items. Decreased word fluency was observed. She presents with mild-moderate dysarthria c/b imprecise consonants, slow rate of speech, and mild hypernasality. Speech intelligibility is judged to be approximately 75-80% with careful listening.  Patient will benefit from outpatient neurological rehabilitation speech therapy.    Functional Communication Measure (FCM):   Severity Modifier for Medicare G-Code:  Motor Speech  Current status: FCM: Level 5  - CJ   Projected status:  FCM: Level 6  - CI       Spoken Language Expression  Current status: FCM:Level 6  -  CI   Projected status:  FCM:  Level 7  -           Rehab Potential: good    Short Term Goals (4 weeks):   1. Pt will name 15-20 items in a concrete category to improve thought organization and word fluency across 3 consecutive sessions.   2. Pt will complete word finding tasks with 90% accuracy supervision across 3 consecutive sessions.  3. Pt will complete verbal expression tasks with 90% accuracy supervision across 3 consecutive sessions.   4. Pt will participate in an assessment of reading and writing.   5. Pt will complete oral motor exercises 10-20 per session to improve oral musculature strength and ROM.  6. Pt will utilize motor speech strategies (i.e. " Overarticulation, slow rate of speech, appropriate breath-speech coordination) in speech tasks with 90% accuracy supervision across 3 consecutive sessions.     Long Term Goals (8 weeks):   1. Pt will develop functional, cognitive-linguistic based skills and utilize compensatory strategies to communicate wants and needs effectively to different conversational partners, maintain safety, and participate socially in functional living environment.   2. Pt will develop functional and intelligible speech and utilize compensatory strategies to communicate effectively in functional living environment.     Education: POC was discussed with pt. Patient and family members expressed understanding.     Plan  Certification Period: 6/5/17 to 12/31/17  Plan of Care Certification Period: 6/13/17 to 8/11/17    Recommended Treatment Plan:  Patient will participate in the [facility] program for speech therapy 2 times per week to address her  Communication and Motor Speech deficits, to educate patient and their family, and to participate in a home exercise program.    Other Recommendations: none at this time      Therapist's Name: ARON Peterson, CCC-SLP     Date: 06/13/2017    I certify the need for these services furnished under this plan of treatment and while under my care.  ____________________________________ Physician/Referring Practitioner   Date of Signature

## 2017-06-15 NOTE — PROGRESS NOTES
"Patient:  Alba [facility] #:  [id number]   Date of Note: 06/15/2017   Referring Physician:  Garry Bennett MD  Diagnosis:    Encounter Diagnoses   Name Primary?    Impaired mobility and ADLs     Poor fine motor skills Yes        Start Time: 1030  End Time: 1115   Total Time: 45min, 30 min one on one  Visit: 4  Subjective: Pt reported difficulty opening chip bags at home.  Pt asked to work on scissor cutting tasks in session.  Pain: 0 out of 10. Pt reported some L UE numbness yesterday but did not have any symptoms today.    Objective: Patient seen by OT this session. Treatment consist of the following therapeutic exercises and activities:  Ergometer 5 min forward and 5 min reverse to increase stamina and endurance of B UE. Fine motor coordination with in hand manipulation to complete x 2 sets of small peg puzzles with tweezers and x 3 sets of multi graded scissor cutting activities.  Pt. Completed pinch strengthening exercises with pink putty while searching for x 15 items.  Sustained  with resisted Pronation/supination and flexion/extension with red flexbar to increased B UE strength x 30 each.  Assessment:  Pt required min verbal cues during fine motor coordination peg task to maintain good pinching technique with R hand. Pt is still completing "kite strings" section of HEP for handwriting to increase independence in money management.  Pt will continue to benefit from skilled OT intervention.  Patient continues to demonstrate limitation with  PROBLEM LIST/IMPAIRMENTS:   decreased muscle strength, decreased fine and gross motor coordination, and decreased endurance and stamina affecting RUE in ADLs and IADL tasks.     LTG GOALS:  Time frame: 10 weeks  Increase ROM/Strength to perform ADL's and functional activities for UE dressing and feeding  Improve 9 hole peg test by 25 sec with R hand for fastners in dressing  Use of RUE 75% of time for feeding tasks  Legible Signature  Increase R  strength 10# for " holding feeding and grooming tools.  G-Code: CJ Carry 20-40% impairment     STG Goals:  Time frame: 5 weeks  Improve 9 hole peg test by 10 sec in R hand  Use of RUE 50% of time for feeding tasks  I in HEP for increased strength of RUE and improved handwriting  Achieve baseline data for speed and accuracy of texting    Plan: Outpatient occupational therapy 2 times weekly for 10 weeks  to include: pt ed, hep, therapeutic exercises, therapeutic activity, ADLs,  neuromuscular re-education, joint mobilizations, modalities prn, certification 5/30/17- 8/11/17    Barb Santo, MIMI  I certify that I was present in the room directing the student in service delivery and guiding them using my skilled judgment. As the co-signing therapist I have reviewed the students documentation and am responsible for the treatment, assessment, and plan.     Mary Stone, OT

## 2017-06-19 ENCOUNTER — CLINICAL SUPPORT (OUTPATIENT)
Dept: REHABILITATION | Facility: HOSPITAL | Age: 77
End: 2017-06-19
Attending: INTERNAL MEDICINE
Payer: MEDICARE

## 2017-06-19 DIAGNOSIS — R47.01 ANOMIC APHASIA: ICD-10-CM

## 2017-06-19 DIAGNOSIS — R26.89 IMPAIRED BALANCE AS LATE EFFECT OF CEREBROVASCULAR ACCIDENT: ICD-10-CM

## 2017-06-19 DIAGNOSIS — Z74.09 IMPAIRED MOBILITY AND ADLS: ICD-10-CM

## 2017-06-19 DIAGNOSIS — I69.322 DYSARTHRIA AS LATE EFFECT OF CEREBROVASCULAR ACCIDENT (CVA): ICD-10-CM

## 2017-06-19 DIAGNOSIS — I69.398 ABNORMALITY OF GAIT AS LATE EFFECT OF CEREBROVASCULAR ACCIDENT (CVA): ICD-10-CM

## 2017-06-19 DIAGNOSIS — Z78.9 IMPAIRED MOBILITY AND ADLS: ICD-10-CM

## 2017-06-19 DIAGNOSIS — I69.398 IMPAIRED BALANCE AS LATE EFFECT OF CEREBROVASCULAR ACCIDENT: ICD-10-CM

## 2017-06-19 DIAGNOSIS — R26.9 ABNORMALITY OF GAIT AS LATE EFFECT OF CEREBROVASCULAR ACCIDENT (CVA): ICD-10-CM

## 2017-06-19 DIAGNOSIS — G81.90 HEMIPLEGIA, UNSPECIFIED ETIOLOGY, UNSPECIFIED HEMIPLEGIA LATERALITY, UNSPECIFIED HEMIPLEGIA TYPE: ICD-10-CM

## 2017-06-19 PROCEDURE — 97110 THERAPEUTIC EXERCISES: CPT | Mod: PO

## 2017-06-19 PROCEDURE — 97530 THERAPEUTIC ACTIVITIES: CPT | Mod: PO

## 2017-06-19 PROCEDURE — 97112 NEUROMUSCULAR REEDUCATION: CPT | Mod: PO

## 2017-06-19 NOTE — PROGRESS NOTES
"Patient:  Alba Vasquez  [facility] #:  [id number]   Date of Note: 06/19/2017   Referring Physician:  Garry Bennett MD  Diagnosis:    Encounter Diagnosis   Name Primary?    Impaired mobility and ADLs         Start Time: 1030  End Time: 1115   Total Time: 45min  Visit: 5  Subjective: Pt continues to report occasional numbness in L UE, specifically in the morning hours or after naps.  Pt stated opening chips bags is, "getting better", and check writing is improving.   Pain: 0 out of 10.   Objective: Patient seen by OT this session. Treatment consist of the following therapeutic exercises and activities:  Ergometer 5 min forward and 5 min reverse to increase stamina and endurance of B UE. Green digigrip individual and composite x 2 min in increase B  strength. Sustained  with resisted pronation/supination and flexion/extension with red flexbar to increased B UE strength x 30 each. Fine motor coordination with B UE for in hand manipulation to complete x 2 small pvc pipe tasks. Pt received information on carpal tunnel wrist splint to help with reported L UE numbness while sleeping. Pt ended session by recieving the "loop group" set of handwriting HEP and completed x 2 pages to demonstrate understanding of instructions.    Assessment:  Pt demos improvements in functional pinch/ by reported success with opening chip bags.  Pt required min verbal cues during fine motor coordination tasks to use R hand. Pt verbalized and demonstrated understanding of new HEP section to increase handwriting for independence in money management as she reports it still takes several trials to write checks.  Pt will continue to benefit from skilled OT intervention.  Patient continues to demonstrate limitation with  PROBLEM LIST/IMPAIRMENTS:   decreased muscle strength, decreased fine and gross motor coordination, and decreased endurance and stamina affecting RUE in ADLs and IADL tasks.     LTG GOALS:  Time frame: 10 weeks  Increase " ROM/Strength to perform ADL's and functional activities for UE dressing and feeding  Improve 9 hole peg test by 25 sec with R hand for fastners in dressing  Use of RUE 75% of time for feeding tasks  Legible Signature  Increase R  strength 10# for holding feeding and grooming tools.  G-Code: CJ Carry 20-40% impairment     STG Goals:  Time frame: 5 weeks  Improve 9 hole peg test by 10 sec in R hand  Use of RUE 50% of time for feeding tasks  I in HEP for increased strength of RUE and improved handwriting  Achieve baseline data for speed and accuracy of texting    Plan: Outpatient occupational therapy 2 times weekly for 10 weeks  to include: pt ed, hep, therapeutic exercises, therapeutic activity, ADLs,  neuromuscular re-education, joint mobilizations, modalities prn, certification 5/30/17- 8/11/17    Barb Santo, OTS  I certify that I was present in the room directing the student in service delivery and guiding them using my skilled judgment. As the co-signing therapist I have reviewed the students documentation and am responsible for the treatment, assessment, and plan.     Mary Stone, OT

## 2017-06-22 ENCOUNTER — CLINICAL SUPPORT (OUTPATIENT)
Dept: REHABILITATION | Facility: HOSPITAL | Age: 77
End: 2017-06-22
Attending: INTERNAL MEDICINE
Payer: MEDICARE

## 2017-06-22 DIAGNOSIS — Z78.9 IMPAIRED MOBILITY AND ADLS: Primary | ICD-10-CM

## 2017-06-22 DIAGNOSIS — R47.01 ANOMIC APHASIA: Primary | ICD-10-CM

## 2017-06-22 DIAGNOSIS — I69.322 DYSARTHRIA AS LATE EFFECT OF CEREBROVASCULAR ACCIDENT (CVA): ICD-10-CM

## 2017-06-22 DIAGNOSIS — Z74.09 IMPAIRED MOBILITY AND ADLS: Primary | ICD-10-CM

## 2017-06-22 PROCEDURE — 97110 THERAPEUTIC EXERCISES: CPT | Mod: PO

## 2017-06-22 PROCEDURE — 97530 THERAPEUTIC ACTIVITIES: CPT | Mod: PO

## 2017-06-22 PROCEDURE — 92507 TX SP LANG VOICE COMM INDIV: CPT | Mod: PO

## 2017-06-22 NOTE — PROGRESS NOTES
"Patient:  Alba Vasquez  [facility] #:  [id number]   Date of Note: 06/22/2017   Referring Physician:  Garry Bennett MD  Diagnosis:    Encounter Diagnosis   Name Primary?    Impaired mobility and ADLs Yes        Start Time: 8748-7811 and then [id number]  Pt. Came late so she finished session post speech therapy.    Total Time: 45min  Visit: 6  Subjective: Pt states L hand wants to takeover.   Pain: 0 out of 10.   Objective: Patient seen by OT this session. Treatment consist of the following therapeutic exercises and activities:  Ergometer 5 min forward and 5 min reverse 90 RPM to increase stamina and endurance of B UE.  Fine motor coordination with B UE for in hand manipulation to complete x [employer] pegs and remove with tweezer in R hand. In hand manipulation with colored stones. Participated in pre-writing task of coloring.Pt  Still has the "loop group" set of handwriting HEP to complete for Monday tx session.  Assessment:  Pt fatigues easily with all tasks. .  Pt required min verbal cues during fine motor coordination tasks to use R hand. Pt verbalized and demonstrated understanding of new HEP section to increase handwriting for independence in money management as she reports it still takes several trials to write checks.  Pt will continue to benefit from skilled OT intervention.  Patient continues to demonstrate limitation with  PROBLEM LIST/IMPAIRMENTS:   decreased muscle strength, decreased fine and gross motor coordination, and decreased endurance and stamina affecting RUE in ADLs and IADL tasks.     LTG GOALS:  Time frame: 10 weeks  Increase ROM/Strength to perform ADL's and functional activities for UE dressing and feeding  Improve 9 hole peg test by 25 sec with R hand for fastners in dressing  Use of RUE 75% of time for feeding tasks  Legible Signature  Increase R  strength 10# for holding feeding and grooming tools.  G-Code: CJ Carry 20-40% impairment     STG Goals:  Time frame: 5 weeks  Improve 9 hole peg " test by 10 sec in R hand  Use of RUE 50% of time for feeding tasks  I in HEP for increased strength of RUE and improved handwriting  Achieve baseline data for speed and accuracy of texting    Plan: Outpatient occupational therapy 2 times weekly for 10 weeks  to include: pt ed, hep, therapeutic exercises, therapeutic activity, ADLs,  neuromuscular re-education, joint mobilizations, modalities prn, certification 5/30/17- 8/11/17      Mary Stone, OT

## 2017-06-22 NOTE — PROGRESS NOTES
"OUTPATIENT NEUROLOGICAL REHABILITATION  SPEECH THERAPY PROGRESS NOTE    Date:  06/22/2017    Start Time:  1030  Stop Time:  1115    Subjective/History  Onset Date:  February 2017  Primary Diagnosis:  L CVA  Treatment Diagnosis:  Anomic Aphasia, Dysarthria  Referring Provider:  Dr. Garry Jennings  Orders: ST for evaluation and treat  Current Medical History:   Alba Vasquez presents to the [facility] secondary to the diagnosis of L CVA. Pt reports that she was admitted to [facility] for approximately 3 days. Upon discharge, she received home health PT, OT, and ST for approximately 3-4 months.   Past Medical History: No past medical history on file.  Precautions:  fall  Prior Therapy:  HH ST  Pain:   0/10  Nutrition:  Regular diet, thin liquids  Environmental Concerns/Cultural/Spiritual/Developmental/Educational Needs: none  Prior Level of Function: Independent  Social History:  Mrs. Vasquez worked for [employer] as an  prior to her penitentiary. She reports that she is very organized and that language and communication have always come naturally to her.   Signs of Abuse: No  Functional Deficits Leading to Referral/Nature of Injury:  Word finding difficulty, slurred speech  Patient Therapy Goals:  "speak fluently again"      TIMED  Procedure Time Min.    Start:   Stop:       Start:  Stop:     Start:  Stop:     Start:  Stop:          UNTIMED  Procedure Time Min.   Speech-Language therapy  Start:    Stop:   45    Start:  Stop:      Total Minutes: 45  Total Timed Units: 0  Total Untimed Units: 1  Charges Billed/# of units: 1    Visit #: 2  Date of Evaluation:   Plan of Care Expiration:     Extended POC:   G-CODE  2/10    eval  CJ/CI Motor Speech   update              Progress/Current Status    Subjective:     Pain: 0 /10  Pt was cooperative and pleasant.    Objective:     Current Progress as of 6/22/17:  Short Term " Goals (4 weeks):   1. Pt will name 15-20 items in a concrete category to improve thought organization and word fluency across 3 consecutive sessions.   - Animals: 10; Food: 10    2. Pt will complete word finding tasks with 90% accuracy supervision across 3 consecutive sessions.  - Pt engaged in conversation with mild word finding difficulty present.   - Word finding strategies were discussed such as thinking about the word she is looking for, describe the item, thinking of the first sound or letter in the word.     3. Pt will complete verbal expression tasks with 90% accuracy supervision across 3 consecutive sessions.   - Pt stated word beginning with certain letters with 43% acc IND'ly, 71% acc given min cues.   - Responsive naming task: 80% acc IND'ly with one delayed response.  - Pt answered questions about a paragraph read with 100% acc for word recall.     4. Pt will participate in an assessment of reading and writing. GOAL MET (6/22/17)  - Pt read two paragraphs and answered questions with 100% acc IND'ly.  - Pt stated that she is in a book club and reads novels. She had trouble concentrating while reading the last book and did not finish it. She denied any reading comprehension difficulty.     5. Pt will complete oral motor exercises 10-20 per session to improve oral musculature strength and ROM.  - OME's were demonstrated and a copy was given for home program. Pt completed the ex's with good ability.    6. Pt will utilize motor speech strategies (i.e. Overarticulation, slow rate of speech, appropriate breath-speech coordination) in speech tasks with 90% accuracy supervision across 3 consecutive sessions.   - Mild imprecise articulation noted. Motor speech strategies were discussed.       Long Term Goals (8 weeks):   1. Pt will develop functional, cognitive-linguistic based skills and utilize compensatory strategies to communicate wants and needs effectively to different conversational partners, maintain  safety, and participate socially in functional living environment.   2. Pt will develop functional and intelligible speech and utilize compensatory strategies to communicate effectively in functional living environment.    Assessment:     Mrs. Vasquez presents to [facility] 2/[facility] a L CVA. She presents with mild anomic aphasia c/b decreased word finding in conversation and when presented with items. Decreased word fluency was observed. She presents with mild-moderate dysarthria c/b imprecise consonants, slow rate of speech, and mild hypernasality. Speech intelligibility is judged to be approximately 75-80% with careful listening.  Patient will benefit from outpatient neurological rehabilitation speech therapy.    Functional Communication Measure (FCM):   Severity Modifier for Medicare G-Code:  Motor Speech  Current status: FCM: Level 5  - CJ   Projected status:  FCM: Level 6  - CI       Spoken Language Expression  Current status: FCM:Level 6  -  CI   Projected status:  FCM:  Level 7  -  CH     Rehab Potential: good    Patient Education/Response:     Education: POC was discussed with pt. Patient and family members expressed understanding.    Plans and Goals:     Plan  Certification Period: 6/5/17 to 12/31/17  Plan of Care Certification Period: 6/13/17 to 8/11/17    Continue POC    Recommended Treatment Plan:  Patient will participate in the [facility] program for speech therapy 2 times per week to address her  Communication and Motor Speech deficits, to educate patient and their family, and to participate in a home exercise program.    Other Recommendations: none at this time    ARON Turcios, CCC-SLP  Speech-Language Pathologist  Outpatient Neurological Rehabilitation

## 2017-06-26 ENCOUNTER — CLINICAL SUPPORT (OUTPATIENT)
Dept: REHABILITATION | Facility: HOSPITAL | Age: 77
End: 2017-06-26
Attending: INTERNAL MEDICINE
Payer: MEDICARE

## 2017-06-26 DIAGNOSIS — R26.9 ABNORMALITY OF GAIT AS LATE EFFECT OF CEREBROVASCULAR ACCIDENT (CVA): ICD-10-CM

## 2017-06-26 DIAGNOSIS — Z74.09 IMPAIRED MOBILITY AND ADLS: ICD-10-CM

## 2017-06-26 DIAGNOSIS — I69.398 ABNORMALITY OF GAIT AS LATE EFFECT OF CEREBROVASCULAR ACCIDENT (CVA): ICD-10-CM

## 2017-06-26 DIAGNOSIS — I69.398 IMPAIRED BALANCE AS LATE EFFECT OF CEREBROVASCULAR ACCIDENT: ICD-10-CM

## 2017-06-26 DIAGNOSIS — R47.01 ANOMIC APHASIA: ICD-10-CM

## 2017-06-26 DIAGNOSIS — Z78.9 IMPAIRED MOBILITY AND ADLS: ICD-10-CM

## 2017-06-26 DIAGNOSIS — I69.322 DYSARTHRIA AS LATE EFFECT OF CEREBROVASCULAR ACCIDENT (CVA): ICD-10-CM

## 2017-06-26 DIAGNOSIS — R26.89 IMPAIRED BALANCE AS LATE EFFECT OF CEREBROVASCULAR ACCIDENT: ICD-10-CM

## 2017-06-26 PROCEDURE — 97116 GAIT TRAINING THERAPY: CPT | Mod: PO,59

## 2017-06-26 PROCEDURE — 92507 TX SP LANG VOICE COMM INDIV: CPT | Mod: PO

## 2017-06-26 PROCEDURE — 97110 THERAPEUTIC EXERCISES: CPT | Mod: PO

## 2017-06-26 PROCEDURE — G8980 MOBILITY D/C STATUS: HCPCS | Mod: CJ,PO

## 2017-06-26 PROCEDURE — G8979 MOBILITY GOAL STATUS: HCPCS | Mod: CJ,PO

## 2017-06-26 PROCEDURE — 97112 NEUROMUSCULAR REEDUCATION: CPT | Mod: PO

## 2017-06-26 PROCEDURE — 97530 THERAPEUTIC ACTIVITIES: CPT | Mod: PO

## 2017-06-26 NOTE — PROGRESS NOTES
"OUTPATIENT NEUROLOGICAL REHABILITATION  SPEECH THERAPY PROGRESS NOTE    Date:  06/26/2017    Start Time:  1040  Stop Time:  1120    Subjective/History  Onset Date:  February 2017  Primary Diagnosis:  L CVA  Treatment Diagnosis:  Anomic Aphasia, Dysarthria  Referring Provider:  Dr. Garry Jennings  Orders: ST for evaluation and treat  Current Medical History:   Alba Vasquez presents to the [facility] secondary to the diagnosis of L CVA. Pt reports that she was admitted to [facility] for approximately 3 days. Upon discharge, she received home health PT, OT, and ST for approximately 3-4 months.   Past Medical History: No past medical history on file.  Precautions:  fall        TIMED  Procedure Time Min.    Start:   Stop:       Start:  Stop:     Start:  Stop:     Start:  Stop:          UNTIMED  Procedure Time Min.   Speech and Language therapy Start:  1040  Stop: 1120  40    Start:  Stop:      Total Minutes: 40  Total Timed Units: 0  Total Untimed Units: 1  Charges Billed/# of units: 1    Visit #: 3  Date of Evaluation:   Plan of Care Expiration:     Extended POC:   G-CODE  3/10    eval  CJ/CI Motor Speech   update            Progress/Current Status    Subjective:     Pain: 0 /10  Mrs. Vasquez reports an increased nasality in her speech. She reports "I don't sound like myself".     Objective:   Short Term Goals (4 weeks):   1. Pt will name 15-20 items in a concrete category to improve thought organization and word fluency across 3 consecutive sessions.   - vegetables: 1 IND'ly, 6 with cues    2. Pt will complete word finding tasks with 90% accuracy supervision across 3 consecutive sessions.  - Pt engaged in conversation with moderate word finding difficulty present. Decreased word fluency observed in conversation re: her upcoming trip. Pt reported frustration re: word finding and repeatedly said "See, I know what I want to say but see that?"  - Word finding " "strategies were reviewed (i.e. Association, visualization, description, first letter or sound, and subcategorization)    3. Pt will complete verbal expression tasks with 90% accuracy supervision across 3 consecutive sessions.   - n/a    4. Pt will participate in an assessment of reading and writing. GOAL MET (6/22/17)  - Pt read two paragraphs and answered questions with 100% acc IND'ly.  - Pt stated that she is in a book club and reads novels. She had trouble concentrating while reading the last book and did not finish it. She denied any reading comprehension difficulty.     5. Pt will complete oral motor exercises 10-20 per session to improve oral musculature strength and ROM.  - OME's were completed with min A. Mirror aided in visual feedback.   - palatal massage was demonstrated. Pt reported that her voice "sounds like me" after palatal massage.    6. Pt will utilize motor speech strategies (i.e. Overarticulation, slow rate of speech, appropriate breath-speech coordination) in speech tasks with 90% accuracy supervision across 3 consecutive sessions.   - Mild imprecise articulation noted. Motor speech strategies were discussed.       Long Term Goals (8 weeks):   1. Pt will develop functional, cognitive-linguistic based skills and utilize compensatory strategies to communicate wants and needs effectively to different conversational partners, maintain safety, and participate socially in functional living environment.   2. Pt will develop functional and intelligible speech and utilize compensatory strategies to communicate effectively in functional living environment.    Assessment:     Mrs. Vasquez presents to [facility] 2/2 a L CVA. She presents with mild anomic aphasia c/b decreased word finding in conversation and when presented with items. Decreased word fluency was observed. She presents with mild-moderate dysarthria c/b imprecise consonants, slow rate of speech, and mild hypernasality. " Speech intelligibility is judged to be approximately 75-80% with careful listening.  Patient will benefit from outpatient neurological rehabilitation speech therapy.    Functional Communication Measure (FCM):   Severity Modifier for Medicare G-Code:  Motor Speech  Current status: FCM: Level 5  - CJ   Projected status:  FCM: Level 6  - CI       Spoken Language Expression  Current status: FCM:Level 6  -  CI   Projected status:  FCM:  Level 7  -  CH     Rehab Potential: good    Patient Education/Response:     Pt asked why she had to complete so many oral motor exercises. SLP showed pt a picture of the muscles of the face and explained how each exercise targets one of those muscles. Pt verbalized understanding. Word retrieval strategies were also reviewed. Pt verbalized understanding.     Plans and Goals:     Continue POC.  Certification Period: 6/5/17 to 12/31/17  Plan of Care Certification Period: 6/13/17 to 8/11/17      Recommended Treatment Plan:  Patient will participate in the [facility] program for speech therapy 2 times per week to address her  Communication and Motor Speech deficits, to educate patient and their family, and to participate in a home exercise program.    Other Recommendations: none at this time      ARON Peterson, CCC-SLP  6/26/2017

## 2017-06-26 NOTE — PROGRESS NOTES
Patient:  Alba [facility] #:  [id number]   Date of Note: 06/26/2017   Referring Physician:  Garry Bennett MD  Diagnosis:    Encounter Diagnosis   Name Primary?    Impaired mobility and ADLs         Start Time: 0945  End Time: 1030   Total Time: 45min, 30 min one on one  Visit: 7  Subjective: Pt reports she avoids trying to cut food when eating out due to difficulty holding utensils.  Pain: 0 out of 10.   Objective: Patient seen by OT this session. Treatment consist of the following therapeutic exercises and activities:  Ergometer 5 min forward and 5 min reverse  to increase stamina and endurance of B UE.  In hand manipulation with marbles while picking up/releasing 1 x 1.  Pinch strengthening task while pulling cards 1 x 1 with opposing resistance. Feeding and motor coordination tasks using graded pink and green resistance putty while cutting using built up utensils.  Fine motor coordination task for coloring graded picture with built up pencil .  Pt received information on where to purchase and how to fabricate personal built up .  Assessment:  Pt did well with feeding task today after adjusting to adapted .  Pt recommended to bring personal adapted silverware with her when eating out to encourage participation and use of R hand.  Pt required min verbal cues during fine motor coordination tasks to use R hand. Pt verbalized and demonstrated understanding of adaptations and seemed motivated to try different  at home.  Pt will continue to benefit from skilled OT intervention.  Patient continues to demonstrate limitation with  PROBLEM LIST/IMPAIRMENTS:   decreased muscle strength, decreased fine and gross motor coordination, and decreased endurance and stamina affecting RUE in ADLs and IADL tasks.     LTG GOALS:  Time frame: 10 weeks  Increase ROM/Strength to perform ADL's and functional activities for UE dressing and feeding  Improve 9 hole peg test by 25 sec with R hand for fastners in  dressing  Use of RUE 75% of time for feeding tasks  Legible Signature  Increase R  strength 10# for holding feeding and grooming tools.  G-Code: CJ Carry 20-40% impairment     STG Goals:  Time frame: 5 weeks  Improve 9 hole peg test by 10 sec in R hand  Use of RUE 50% of time for feeding tasks  I in HEP for increased strength of RUE and improved handwriting  Achieve baseline data for speed and accuracy of texting    Plan: Outpatient occupational therapy 2 times weekly for 10 weeks  to include: pt ed, hep, therapeutic exercises, therapeutic activity, ADLs,  neuromuscular re-education, joint mobilizations, modalities prn, certification 5/30/17- 8/11/17    Barb Santo, MIMI  I certify that I was present in the room directing the student in service delivery and guiding them using my skilled judgment. As the co-signing therapist I have reviewed the students documentation and am responsible for the treatment, assessment, and plan.     Mary Stone, OT

## 2017-06-26 NOTE — PROGRESS NOTES
Physical Therapy Progress Note     Name: Alba Vasquez  [facility] Number: [id number]  Diagnosis:   Encounter Diagnoses   Name Primary?    Abnormality of gait as late effect of cerebrovascular accident (CVA)     Impaired balance as late effect of cerebrovascular accident      Physician: Garry Bennett MD  Treatment Orders: PT Eval and Treat  No past medical history on file.    Evaluation Date: 5/29/17  Visit #: 4  Plan of care expiration: 7/23/17  Precautions: standard; aphasic    G codes 4/10    FOTO On ST caseload         Subjective   Pt reports: no new complaints today. Pt states that she has been HEP compliant and that she has been going to the gym 3x/week to ride the recumbent bike.   Pain Scale:  None reported    Objective     Patient received individual therapy with activities as follows:     Patient participated in gait training activities to normalize gait pattern for 10 minutes. The following activities were included:   Gait belt used for safety. Assistive device: none      Gait Assessment:   - AD used: None  - Assistance: Tima  - Distance: 300+ feet      GAIT DEVIATIONS:  Alba displays the following deviations with ambulation: appropriate step length and mary noted compared to initial evaluation  Impairments contributing to deviations: impaired motor control     Endurance Deficit: mild       Evaluation 6/26/17   Timed Up and Go 10 sec 9 sec   Self Selected Walking Speed 1.0 m/sec (6m/6s) 1.2 m/sec (6m/5s)   ABC  96.3% 97.5%          Alba received therapeutic exercises to develop strength and flexibility for 25 minutes including:   Recumbent bike x 10 min, L12 no rest breaks needed. Pt reported minimal difficulty.     Coordination:   - LE coordination:  heel-toe tapping WFL, simultaneous, alternating, fast, and slow     RANGE OF MOTION--LOWER EXTREMITIES  (R) LE Hip: normal                        Knee: normal                        Ankle: normal     (L)  LE: Hip: normal                        Knee: normal                        Ankle: normal     Strength: manual muscle test grades below   Lower Extremity Strength  Right LE  Evaluation 6/26/17 Left LE  Evaluation 6/26/17   Hip Flexion: 4/5 4+/5 Hip Flexion: 5/5 5/5   Hip Abduction: 5/5 5/5 Hip Abduction: 5/5 5/5   Hip Adduction: 5/5 5/5 Hip Adduction 5/5 5/5   Knee Extension: 5/5 5/5 Knee Extension: 5/5 5/5   Knee Flexion: 5/5 5/5 Knee Flexion: 5/5 5/5   Ankle Dorsiflexion: 5/5 5/5 Ankle Dorsiflexion: 5/5 5/5   Ankle Plantarflexion: 5/5 5/5 Ankle Plantarflexion: 5/5 5/5       Patient participated in neuromuscular re-education activities to improve: Balance and Coordination for 10 minutes. The following activities were included:       Evaluation 6/26/17   Single Limb Stance R LE  5s  (<10 sec = HIGH FALL RISK) 15 s  (<10 sec = HIGH FALL)   Single Limb Stance L LE 5s  (<10 sec = HIGH FALL RISK) 34 s  (<10 sec = HIGH FALL)   30 second Chair Rise 10 completed with no arms 13 completed with no arms     Functional Gait Assessment:   1. Gait on level surface =  3  2. Change in Gait Speed = 3  3. Gait with horizontal head turns  = 3  4. Gait with vertical head turns = 3  5. Gait with pivot turns = 3  6. Step over obstacle = 2  7. Gait with Narrow WILLY = 2  8. Gait with eyes closed = 2  9. Ambulating Backwards = 3   10. Steps = 2   Score 26/30     Functional Limitations Reports - G Codes  Category:  Mobility  Tool: FOTO   Goal: Predicted 66% 34% Goal Status+ CJ - At least 20 percent but less than 40 percent  Discharge: Status: 73%, Limitation 27% CJ at least 20% < 40% impaired, limited or restricted       Written Home Exercises: heel raises, toe raises, HS stretch, SLS, tandem stance, hip abductions.  (see pt instructions 6/5/17)  Pt demo good understanding of the education provided. Alba demonstrated good return demonstration of activities.     Education provided re: d/c from OP PT services; PT reviewed HEP and exercise  program with patient. PT instructed pt that is patient noticed a change in ambulation, functional mobility, or balance to come back to OP PT for reassessment and potential follow-up. Pt verbalized good understanding to PT.   POC, HEP  No spiritual or educational barriers to learning provided    Pt has no cultural, educational or language barriers to learning provided.    Assessment   Alba tolerated therapy session well this morning with no complaints. Pt reassessed this therapy session for discharge from OP physical therapy. Pt demonstrates improved R hip flexion strength, improved balance, improved endurance, improved gait, and improved coordination. Overall, patient is at a decreased fall risk. Additionally, pt compliant with HEP and exercise program at local gym. PT reviewed HEP and exercise program with patient for continued strength, balance, and endurance improvements, and pt reports no further concerns or questions.     Pt rehab potential is Good. Pt will benefit from continuing skilled outpatient physical therapy to address the deficits listed below in the problem list, provide pt/family education and to maximize pt's level of independence in the home and community environment.      History  Co-morbidities and personal factors that may impact the plan of care Examination  Body Structures and Functions, activity limitations and participation restrictions that may impact the plan of care. Medical necessity is demonstrated by the following impairments. Clinical Presentation Decision Making/ Complexity Score   1. Lives alone, minimal social  1. Impaired gait   2. Impaired balance  3. Impaired coordination  4. L UE impairments  5. aphasia stable     moderate high low low         Pt's spiritual, cultural and educational needs considered and pt agreeable to plan of care and goals as stated below:      GOALS:   Short term goals: 4 weeks, pt agrees to goals set.  1. Pt will improve SLS time on R to at least 10  seconds for decreased fall risk. MET  2. Pt will improve SLS time on L to at least 10 seconds for decreased fall risk. MET  3. Pt will improve FGA score to at least 25/30 for increased safety with community ambulation. MET        Long term goals: 8 weeks, pt agrees to goals set  1. Pt will improve FGA score to at least 28/30 for increased safety with community ambulation.   2. Pt will improve SSWS to at least 1.2m/s for improved community ambulation. MET  3. Pt will improve FOTO score to at least 66% for improved self perception of functional mobility. MET     PHYSICAL THERAPY DISCHARGE SUMMARY     Status Towards Goals Met: Pt met 3/3 STGs and 2/3 LTGs.     Goals Not achieved and why:   LTG for FGA score note met 2/2 mild balance deficits noted during tandem ambulation, ambulation c/ eyes closed, stepping over an obstacle (pt able to step over 1 block but unable to perform forward flexion step over 2 blocks stacked on top of each other without circumduction to clear), and use of 1 HR during stair descent for steadying balance.  Over course of outpatient PT therapy, pt has demonstrated improved balance, but pt does continue to demonstrate mild balance deficits with more complex balance tasks and stepping over high obstacles.         Discharge reason : Pt c/ limited therapy visits due to insurance and multi-disciplinary therapy attendance. At this time, patient is (I) and compliant with HEP and goes to the gym 3x/week to ride the recumbent bike. Pt has demonstrated improved RLE strength, improved balance, improved coordination, improved endurance, and improved gait, and is therefore at a decreased risk of fall. At this time, pt has greater OT and speech therapy needs and would benefit to use remaining therapy visits toward these disciplines.     PLAN   This patient is discharged from Outpatient Physical Therapy Services.     Selena Whitney, PT  06/26/2017

## 2017-06-29 ENCOUNTER — CLINICAL SUPPORT (OUTPATIENT)
Dept: REHABILITATION | Facility: HOSPITAL | Age: 77
End: 2017-06-29
Attending: INTERNAL MEDICINE
Payer: MEDICARE

## 2017-06-29 DIAGNOSIS — G81.00: ICD-10-CM

## 2017-06-29 DIAGNOSIS — I69.322 DYSARTHRIA AS LATE EFFECT OF CEREBROVASCULAR ACCIDENT (CVA): ICD-10-CM

## 2017-06-29 DIAGNOSIS — Z78.9 IMPAIRED MOBILITY AND ADLS: ICD-10-CM

## 2017-06-29 DIAGNOSIS — Z74.09 IMPAIRED MOBILITY AND ADLS: ICD-10-CM

## 2017-06-29 DIAGNOSIS — R47.01 ANOMIC APHASIA: ICD-10-CM

## 2017-06-29 PROCEDURE — 97110 THERAPEUTIC EXERCISES: CPT | Mod: PO

## 2017-06-29 PROCEDURE — 92507 TX SP LANG VOICE COMM INDIV: CPT | Mod: PO

## 2017-06-29 PROCEDURE — 97530 THERAPEUTIC ACTIVITIES: CPT | Mod: PO

## 2017-06-29 NOTE — PROGRESS NOTES
OUTPATIENT NEUROLOGICAL REHABILITATION  SPEECH THERAPY PROGRESS NOTE    Date:  06/29/2017    Start Time:  900  Stop Time:  945    Subjective/History  Onset Date:  February 2017  Primary Diagnosis:  L CVA  Treatment Diagnosis:  Anomic Aphasia, Dysarthria  Referring Provider:  Dr. Garry Jennings  Orders: ST for evaluation and treat  Current Medical History:   Alba Vasquez presents to the [facility] secondary to the diagnosis of L CVA. Pt reports that she was admitted to [facility] for approximately 3 days. Upon discharge, she received home health PT, OT, and ST for approximately 3-4 months.   Past Medical History: No past medical history on file.  Precautions:  general        TIMED  Procedure Time Min.    Start:   Stop:       Start:  Stop:     Start:  Stop:     Start:  Stop:          UNTIMED  Procedure Time Min.   Speech and Language Therapy Start:  900  Stop: 945  45    Start:  Stop:      Total Minutes: 45  Total Timed Units: 0  Total Untimed Units: 1  Charges Billed/# of units: 1    Visit #: 4  Date of Evaluation:   Plan of Care Expiration:     Extended POC:   G-CODE  4/10    eval  CJ/CI Motor Speech   update            Progress/Current Status    Subjective:     Pain: 0 /10  Pt reports that she feels she has made significant progress in ST in just two weeks. She reports that her friend told her that she doesn't even sound like she had a stroke anymore.     Objective:     Short Term Goals (4 weeks):   1. Pt will name 15-20 items in a concrete category to improve thought organization and word fluency across 3 consecutive sessions.   - drinks: 7 IND'ly, 8 with verbal cues  - fruit: 4 IND'ly, 8 given a verbal cue.     2. Pt will complete word finding tasks with 90% accuracy supervision across 3 consecutive sessions.  - Pt engaged in conversation with moderate word finding difficulty present. Decreased word fluency observed in conversation re: where she  "ate dinner last night.   - Word finding strategies were reviewed (i.e. Association, visualization, description, first letter or sound, and subcategorization)    3. Pt will complete verbal expression tasks with 90% accuracy supervision across 3 consecutive sessions.   - n/a    4. Pt will participate in an assessment of reading and writing. GOAL MET (6/22/17)  - Pt read two paragraphs and answered questions with 100% acc IND'ly.  - Pt stated that she is in a book club and reads novels. She had trouble concentrating while reading the last book and did not finish it. She denied any reading comprehension difficulty.     5. Pt will complete oral motor exercises 10-20 per session to improve oral musculature strength and ROM.  - OME's were completed with min A.   - palatal massage was demonstrated. Pt reported that her voice "sounds like me" after palatal massage.    6. Pt will utilize motor speech strategies (i.e. Overarticulation, slow rate of speech, appropriate breath-speech coordination) in speech tasks with 90% accuracy supervision across 3 consecutive sessions.   - pt was 100% intelligible in motor speech tasks (i.e. Reading multisyllabic words and reading 5-7 word sentences)  - decreased articulatory precision observed in conversation resulting in 80% intelligiblity.       Long Term Goals (8 weeks):   1. Pt will develop functional, cognitive-linguistic based skills and utilize compensatory strategies to communicate wants and needs effectively to different conversational partners, maintain safety, and participate socially in functional living environment.   2. Pt will develop functional and intelligible speech and utilize compensatory strategies to communicate effectively in functional living environment.    Assessment:     Mrs. Vasquez presents to [facility] 2/[facility] a L CVA. She presents with mild anomic aphasia c/b decreased word finding in conversation and when presented with items. Decreased " word fluency was observed. She presents with mild-moderate dysarthria c/b imprecise consonants, slow rate of speech, and mild hypernasality. Speech intelligibility is judged to be approximately 75-80% with careful listening.  Patient will benefit from outpatient neurological rehabilitation speech therapy.    Functional Communication Measure (FCM):   Severity Modifier for Medicare G-Code:  Motor Speech  Current status: FCM: Level 5  - CJ   Projected status:  FCM: Level 6  - CI       Spoken Language Expression  Current status: FCM:Level 6  -  CI   Projected status:  FCM:  Level 7  -  CH     Rehab Potential: good    Patient Education/Response:     Word retrieval strategies and motor speech strategies were reviewed with pt. Pt verbalized understanding.     Plans and Goals:     Continue POC.  Certification Period: 6/5/17 to 12/31/17  Plan of Care Certification Period: 6/13/17 to 8/11/17      Recommended Treatment Plan:  Patient will participate in the [facility] program for speech therapy 2 times per week to address her  Communication and Motor Speech deficits, to educate patient and their family, and to participate in a home exercise program.    Other Recommendations: none at this time      ARON Peterson, CCC-SLP  6/29/2017

## 2017-06-29 NOTE — PROGRESS NOTES
Patient:  [facility]ba [facility] #:  [id number]   Date of Note: 06/29/2017   Referring Physician:  Garry Bennett MD  Diagnosis:    No diagnosis found.     Start Time: 0945  End Time: 1030   Total Time: 45min  Visit: 8  Subjective: Pt reports she went out to dinner and was able to use B hands to cut her food using the restaurant's utensils  Pain: 0 out of 10.   Objective: Patient seen by OT this session. Treatment consist of the following therapeutic exercises and activities:  Ergometer 5 min forward and 5 min reverse with level 3 resistance to increase stamina and endurance of B UE.  Pt performed the following for proprioceptive input for R hand while in functional standing position using green resistance putty, putty presses with palm using large putty tool, and intrinsic tool for putty pulls and turns x 10 min.  In hand manipulation using small PVC pipes to build puzzles x 2 with emphasis on using B hands.  Fine motor and in hand manipulation to string small beads using B hands. Pt scheduled more appointments with OT at end of session.  Assessment:  Pt was excited to report her success using utensils while she was out to eat.  Pt shows improvements with pinching skills as demonstrated by improved ability to string small beads using R hand. Pt verbalized and demonstrated understanding of adaptations and seemed motivated to try different  at home.  Pt will continue to benefit from skilled OT intervention.  Patient continues to demonstrate limitation with  PROBLEM LIST/IMPAIRMENTS:   decreased muscle strength, decreased fine and gross motor coordination, and decreased endurance and stamina affecting RUE in ADLs and IADL tasks.     LTG GOALS:  Time frame: 10 weeks  Increase ROM/Strength to perform ADL's and functional activities for UE dressing and feeding  Improve 9 hole peg test by 25 sec with R hand for fastners in dressing  Use of RUE 75% of time for feeding tasks  Legible Signature  Increase R  strength 10#  for holding feeding and grooming tools.  G-Code: CJ Carry 20-40% impairment     STG Goals:  Time frame: 5 weeks  Improve 9 hole peg test by 10 sec in R hand  Use of RUE 50% of time for feeding tasks  I in HEP for increased strength of RUE and improved handwriting  Achieve baseline data for speed and accuracy of texting    Plan: Outpatient occupational therapy 2 times weekly for 10 weeks  to include: pt ed, hep, therapeutic exercises, therapeutic activity, ADLs,  neuromuscular re-education, joint mobilizations, modalities prn, certification 5/30/17- 8/11/17    MIMI Starks  I certify that I was present in the room directing the student in service delivery and guiding them using my skilled judgment. As the co-signing therapist I have reviewed the students documentation and am responsible for the treatment, assessment, and plan.     Mary Stone, OT

## 2017-07-03 ENCOUNTER — CLINICAL SUPPORT (OUTPATIENT)
Dept: REHABILITATION | Facility: HOSPITAL | Age: 77
End: 2017-07-03
Attending: INTERNAL MEDICINE
Payer: MEDICARE

## 2017-07-03 DIAGNOSIS — I69.322 DYSARTHRIA AS LATE EFFECT OF CEREBROVASCULAR ACCIDENT (CVA): ICD-10-CM

## 2017-07-03 DIAGNOSIS — R47.01 ANOMIC APHASIA: ICD-10-CM

## 2017-07-03 PROCEDURE — 92507 TX SP LANG VOICE COMM INDIV: CPT | Mod: PO

## 2017-07-03 NOTE — PROGRESS NOTES
OUTPATIENT NEUROLOGICAL REHABILITATION  SPEECH THERAPY PROGRESS NOTE    Date:  07/03/2017    Start Time:  1030  Stop Time:  1115    Subjective/History  Onset Date:  February 2017  Primary Diagnosis:  L CVA  Treatment Diagnosis:  Anomic Aphasia, Dysarthria  Referring Provider:  Dr. Garry Jennings  Orders: ST for evaluation and treat  Current Medical History:   Alba Vasquez presents to the [facility] secondary to the diagnosis of L CVA. Pt reports that she was admitted to [facility] for approximately 3 days. Upon discharge, she received home health PT, OT, and ST for approximately 3-4 months.   Past Medical History: No past medical history on file.  Precautions:  general        TIMED  Procedure Time Min.    Start:   Stop:       Start:  Stop:     Start:  Stop:     Start:  Stop:          UNTIMED  Procedure Time Min.   Speech and Language Therapy Start:  1030  Stop: 1115  45    Start:  Stop:      Total Minutes: 45  Total Timed Units: 0  Total Untimed Units: 1  Charges Billed/# of units: 1    Visit #: 5  Date of Evaluation:   Plan of Care Expiration:     Extended POC:   G-CODE  5/10    eval  CJ/CI Motor Speech   update            Progress/Current Status    Subjective:     Pain: 0 /10  Pt reports that her friends told her this weekend that she has made significant improvement in her speech.     Objective:   Short Term Goals (4 weeks):   1. Pt will name 15-20 items in a concrete category to improve thought organization and word fluency across 3 consecutive sessions.   - musicians - 2  - furniture - 11, 13 given verbal cues  - states - 18    2. Pt will complete word finding tasks with 90% accuracy supervision across 3 consecutive sessions.  - Pt engaged in conversation with moderate word finding difficulty present. Decreased word fluency observed in conversation re: where she ate dinner last night.   - Word finding strategies were reviewed (i.e. Association,  "visualization, description, first letter or sound, and subcategorization)    3. Pt will complete verbal expression tasks with 90% accuracy supervision across 3 consecutive sessions.   - pt described her weekend with minimal word finding deficits observed. Pt reports that her word finding deficits have decreased in recent weeks.     4. Pt will participate in an assessment of reading and writing. GOAL MET (6/22/17)  5. Pt will complete oral motor exercises 10-20 per session to improve oral musculature strength and ROM.  - OME's were completed with supervision 10x   - palatal massage was demonstrated. Pt reported that her voice "sounds like me" after palatal massage.    6. Pt will utilize motor speech strategies (i.e. Overarticulation, slow rate of speech, appropriate breath-speech coordination) in speech tasks with 90% accuracy supervision across 3 consecutive sessions.   - pt was 100% intelligible in motor speech tasks (i.e. Reading multisyllabic words ) METx2  - decreased articulatory precision observed in conversation resulting in 85% intelligiblity.       Long Term Goals (8 weeks):   1. Pt will develop functional, cognitive-linguistic based skills and utilize compensatory strategies to communicate wants and needs effectively to different conversational partners, maintain safety, and participate socially in functional living environment.   2. Pt will develop functional and intelligible speech and utilize compensatory strategies to communicate effectively in functional living environment.    Assessment:     Mrs. Vasquez presents to [facility] 2/2 a L CVA. She presents with mild anomic aphasia c/b decreased word finding in conversation and when presented with items. Decreased word fluency was observed. She presents with mild-moderate dysarthria c/b imprecise consonants, slow rate of speech, and mild hypernasality. Speech intelligibility is judged to be approximately 75-80% with careful " listening.  Patient will benefit from outpatient neurological rehabilitation speech therapy.    Functional Communication Measure (FCM):   Severity Modifier for Medicare G-Code:  Motor Speech  Current status: FCM: Level 5  - CJ   Projected status:  FCM: Level 6  - CI       Spoken Language Expression  Current status: FCM:Level 6  -  CI   Projected status:  FCM:  Level 7  -  CH     Rehab Potential: good    Patient Education/Response:     Word retrieval strategies reviewed with pt. Pt verbalize understanding.     Plans and Goals:     Continue POC.  Certification Period: 6/5/17 to 12/31/17  Plan of Care Certification Period: 6/13/17 to 8/11/17      Recommended Treatment Plan:  Patient will participate in the [facility] program for speech therapy 2 times per week to address her  Communication and Motor Speech deficits, to educate patient and their family, and to participate in a home exercise program.    Other Recommendations: none at this time      ARON Peterson, CCC-SLP  7/3/2017

## 2017-07-05 ENCOUNTER — CLINICAL SUPPORT (OUTPATIENT)
Dept: REHABILITATION | Facility: HOSPITAL | Age: 77
End: 2017-07-05
Attending: INTERNAL MEDICINE
Payer: MEDICARE

## 2017-07-05 DIAGNOSIS — I69.322 DYSARTHRIA AS LATE EFFECT OF CEREBROVASCULAR ACCIDENT (CVA): ICD-10-CM

## 2017-07-05 DIAGNOSIS — R47.01 ANOMIC APHASIA: ICD-10-CM

## 2017-07-05 PROCEDURE — 92507 TX SP LANG VOICE COMM INDIV: CPT | Mod: PO

## 2017-07-05 NOTE — PROGRESS NOTES
"OUTPATIENT NEUROLOGICAL REHABILITATION  SPEECH THERAPY PROGRESS NOTE    Date:  07/05/2017    Start Time:  1030  Stop Time:  1115    Subjective/History  Onset Date:  February 2017  Primary Diagnosis:  L CVA  Treatment Diagnosis:  Anomic Aphasia, Dysarthria  Referring Provider:  Dr. Garry Jennings  Orders: ST for evaluation and treat  Current Medical History:   Alba Vasquez presents to the [facility] secondary to the diagnosis of L CVA. Pt reports that she was admitted to [facility] for approximately 3 days. Upon discharge, she received home health PT, OT, and ST for approximately 3-4 months  Past Medical History: No past medical history on file.  Precautions:  general      TIMED  Procedure Time Min.    Start:   Stop:       Start:  Stop:     Start:  Stop:     Start:  Stop:          UNTIMED  Procedure Time Min.   Speech and Language therapy Start:  1030  Stop: 1115  45    Start:  Stop:      Total Minutes: 45  Total Timed Units: 0  Total Untimed Units: 1  Charges Billed/# of units: 1    Visit #: 6  Date of Evaluation:   Plan of Care Expiration:     Extended POC:   G-CODE  6/10    eval  CJ/CI Motor Speech   update              Progress/Current Status    Subjective:     Pain: 0 /10  Pt reports that "each day I sound more and more like me".    Objective:     Short Term Goals (4 weeks):   1. Pt will name 15-20 items in a concrete category to improve thought organization and word fluency across 3 consecutive sessions.   - food- 6 IND'ly, 12 given verbal cues  - restaurants- 4 IND'ly, 7 given verbal cues    2. Pt will complete word finding tasks with 90% accuracy supervision across 3 consecutive sessions.  - Pt engaged in conversation with moderate word finding difficulty present. Decreased word fluency observed in conversation.  - Word finding strategies were reviewed (i.e. Association, visualization, description, first letter or sound, and " "subcategorization)    3. Pt will complete verbal expression tasks with 90% accuracy supervision across 3 consecutive sessions.   - n/a    4. Pt will participate in an assessment of reading and writing. GOAL MET (6/22/17)  5. Pt will complete oral motor exercises 10-20 per session to improve oral musculature strength and ROM.  - OME's were completed with supervision 10x   - palatal massage with lemon glycerin swab was performed. Pt reported that her voice "sounds like me" after palatal massage.      6. Pt will utilize motor speech strategies (i.e. Overarticulation, slow rate of speech, appropriate breath-speech coordination) in speech tasks with 90% accuracy supervision across 3 consecutive sessions. Goal Met 7/5/17  - pt was 100% intelligible in motor speech tasks (i.e. Reading a paragraph ) METx3  - decreased articulatory precision observed in conversation resulting in 85% intelligiblity.   - nasality increased at rate of speech increased.       Long Term Goals (8 weeks):   1. Pt will develop functional, cognitive-linguistic based skills and utilize compensatory strategies to communicate wants and needs effectively to different conversational partners, maintain safety, and participate socially in functional living environment.   2. Pt will develop functional and intelligible speech and utilize compensatory strategies to communicate effectively in functional living environment.    Assessment:       Mrs. Vasquez presents to [facility] 2/2 a L CVA. She presents with mild anomic aphasia c/b decreased word finding in conversation and when presented with items. Decreased word fluency was observed. She presents with mild-moderate dysarthria c/b imprecise consonants, slow rate of speech, and mild hypernasality. Speech intelligibility is judged to be approximately 75-80% with careful listening.  Patient will benefit from outpatient neurological rehabilitation speech therapy.    Functional " Communication Measure (FCM):   Severity Modifier for Medicare G-Code:  Motor Speech  Current status: FCM: Level 5  - CJ   Projected status:  FCM: Level 6  - CI       Spoken Language Expression  Current status: FCM:Level 6  -  CI   Projected status:  FCM:  Level 7  -  CH     Rehab Potential: good    Patient Education/Response:     Motor speech strategies were reviewed with pt. Pt verbalized understanding.     Plans and Goals:       Continue POC.  Certification Period: 6/5/17 to 12/31/17  Plan of Care Certification Period: 6/13/17 to 8/11/17      Recommended Treatment Plan:  Patient will participate in the [facility] program for speech therapy 2 times per week to address her  Communication and Motor Speech deficits, to educate patient and their family, and to participate in a home exercise program.    Other Recommendations: none at this time      ARON Peterson, CCC-SLP  7/6/2017

## 2017-07-10 ENCOUNTER — CLINICAL SUPPORT (OUTPATIENT)
Dept: REHABILITATION | Facility: HOSPITAL | Age: 77
End: 2017-07-10
Attending: INTERNAL MEDICINE
Payer: MEDICARE

## 2017-07-10 DIAGNOSIS — Z78.9 IMPAIRED MOBILITY AND ADLS: ICD-10-CM

## 2017-07-10 DIAGNOSIS — Z74.09 IMPAIRED MOBILITY AND ADLS: ICD-10-CM

## 2017-07-10 DIAGNOSIS — I69.322 DYSARTHRIA AS LATE EFFECT OF CEREBROVASCULAR ACCIDENT (CVA): ICD-10-CM

## 2017-07-10 DIAGNOSIS — G81.00: ICD-10-CM

## 2017-07-10 DIAGNOSIS — R47.01 ANOMIC APHASIA: ICD-10-CM

## 2017-07-10 PROCEDURE — 92507 TX SP LANG VOICE COMM INDIV: CPT | Mod: PO

## 2017-07-10 PROCEDURE — 97530 THERAPEUTIC ACTIVITIES: CPT | Mod: 59,PO

## 2017-07-10 NOTE — PROGRESS NOTES
"Patient:  Alba [facility] #:  [id number]   Date of Note: 07/10/2017   Referring Physician:  Garry Bennett MD  Diagnosis:    Encounter Diagnoses   Name Primary?    Flaccid hemiplegia, unspecified etiology, unspecified hemiplegia laterality     Impaired mobility and ADLs         Start Time: 1045  End Time: 1115   Total Time: 30 min, 15 one on one - Pt was running 15 min late due to meeting.  Visit: 9  Subjective: Pt reports she is getting stronger but still struggles picking up small items with R hand.  She was unable to  "cherry stem" from table last night.  Pain: 0 out of 10 in UE.  Some soreness reported in R knee.  Objective: Patient seen by OT this session. Treatment consist of the following therapeutic exercises and activities:  Ergometer 5 min forward and 5 min reverse with level 3 resistance to increase stamina and endurance of B UE.   strengthening using green digigrip for individual and composite x 2 min each, extensor strengthening using red rubber band resistance x 2 min. In hand manipulation to screw/unscrew x 5 nuts and bolts using B hands, and small color pegs on pegboard and removing with tweezers using R hand.  Pt was given list of fine motor coordination exercises to do at home and verbalized understanding of HEP.  Assessment:  Fine motor coordination skills have improved since initial evaluation as noted by Pt abiliy to place increased quantity of small pegs in less time.  Pt verbalized and demonstrated understanding of HEP for improving fine motor coordination.  Pt will continue to benefit from skilled OT intervention.  Patient continues to demonstrate limitation with  PROBLEM LIST/IMPAIRMENTS:   decreased muscle strength, decreased fine and gross motor coordination, and decreased endurance and stamina affecting RUE in ADLs and IADL tasks.     LTG GOALS:  Time frame: 10 weeks  Increase ROM/Strength to perform ADL's and functional activities for UE dressing and feeding  Improve 9 " hole peg test by 25 sec with R hand for fastners in dressing  Use of RUE 75% of time for feeding tasks  Legible Signature  Increase R  strength 10# for holding feeding and grooming tools.  G-Code: CJ Carry 20-40% impairment     STG Goals:  Time frame: 5 weeks  Improve 9 hole peg test by 10 sec in R hand  Use of RUE 50% of time for feeding tasks  I in HEP for increased strength of RUE and improved handwriting  Achieve baseline data for speed and accuracy of texting    Plan: Outpatient occupational therapy 2 times weekly for 10 weeks  to include: pt ed, hep, therapeutic exercises, therapeutic activity, ADLs,  neuromuscular re-education, joint mobilizations, modalities prn, certification 5/30/17- 8/11/17    Barb Santo, OTS  I certify that I was present in the room directing the student in service delivery and guiding them using my skilled judgment. As the co-signing therapist I have reviewed the students documentation and am responsible for the treatment, assessment, and plan.     Mary Stone, OT

## 2017-07-10 NOTE — PROGRESS NOTES
OUTPATIENT NEUROLOGICAL REHABILITATION  SPEECH THERAPY PROGRESS NOTE    Date:  07/10/2017    Start Time:  1115  Stop Time:  1155    Subjective/History  Onset Date:  February 2017  Primary Diagnosis:  L CVA  Treatment Diagnosis:  Anomic Aphasia, Dysarthria  Referring Provider:  Dr. Garry Jennings  Orders: ST for evaluation and treat  Current Medical History:   Alba Vasquez presents to the [facility] secondary to the diagnosis of L CVA. Pt reports that she was admitted to [facility] for approximately 3 days. Upon discharge, she received home health PT, OT, and ST for approximately 3-4 months  Past Medical History: No past medical history on file.  Precautions:  fall        TIMED  Procedure Time Min.    Start:   Stop:       Start:  Stop:     Start:  Stop:     Start:  Stop:          UNTIMED  Procedure Time Min.   Speech and Language Therapy Start:  1115  Stop: 1155  40    Start:  Stop:      Total Minutes: 40  Total Timed Units: 0  Total Untimed Units: 1  Charges Billed/# of units: 1    Visit #: 7  Date of Evaluation:   Plan of Care Expiration:     Extended POC:   G-CODE  7/10    eval  CJ/CI Motor Speech   update            Progress/Current Status    Subjective:     Pain: 5 /10  Pt reports a pain level 5 in her knee that worsens when she stands. Pt reports her doctor told her it was arthritis.     Objective:     Short Term Goals (4 weeks):   1. Pt will name 15-20 items in a concrete category to improve thought organization and word fluency across 3 consecutive sessions.   - pt completed word fluency task (15-20 items in a category) for homework. She was able to name an average of 13-15 items with no time constraints. With verbal cues, she was able to name 15-20 items in the same categories.    2. Pt will complete word finding tasks with 90% accuracy supervision across 3 consecutive sessions.  - Pt engaged in conversation with moderate word finding  "difficulty present. Decreased word fluency observed in conversation.  - Word finding strategies were reviewed (i.e. Association, visualization, description, first letter or sound, and subcategorization)    3. Pt will complete verbal expression tasks with 90% accuracy supervision across 3 consecutive sessions.   - Pt described the movie she saw with her daughter this weekend with min A. She demonstrated intermittent word finding deficits but was able to utilize compensatory strategies to find the correct word.     4. Pt will participate in an assessment of reading and writing. GOAL MET (6/22/17)    5. Pt will complete oral motor exercises 10-20 per session to improve oral musculature strength and ROM.  - OME's were completed with supervision 10x   - palatal massage with lemon glycerin swab was performed. Pt reported that her voice "sounds like me" after palatal massage.      6. Pt will utilize motor speech strategies (i.e. Overarticulation, slow rate of speech, appropriate breath-speech coordination) in speech tasks with 90% accuracy supervision across 3 consecutive sessions. Goal Met 7/5/17  6a. New Goal: Pt will utilize motor speech strategies (i.e. Overarticulation, slow rate of speech, appropriate breath-speech coordination) in conversation with 90% accuracy supervision across 3 consecutive sessions.   - pt described a paragraph that she had just read with 70% accuracy supervision  - decreased articulatory precision observed in conversation resulting in 85% intelligiblity.   - nasality increased at rate of speech increased.       Long Term Goals (8 weeks):   1. Pt will develop functional, cognitive-linguistic based skills and utilize compensatory strategies to communicate wants and needs effectively to different conversational partners, maintain safety, and participate socially in functional living environment.   2. Pt will develop functional and intelligible speech and utilize compensatory strategies to " communicate effectively in functional living environment.    Assessment:     Mrs. Vsaquez presents to [facility] 2/2 a L CVA. She presents with mild anomic aphasia c/b decreased word finding in conversation and when presented with items. Decreased word fluency was observed. She presents with mild-moderate dysarthria c/b imprecise consonants, slow rate of speech, and mild hypernasality. Speech intelligibility is judged to be approximately 75-80% with careful listening.  Patient will benefit from outpatient neurological rehabilitation speech therapy.    Functional Communication Measure (FCM):   Severity Modifier for Medicare G-Code:  Motor Speech  Current status: FCM: Level 5  - CJ   Projected status:  FCM: Level 6  - CI       Spoken Language Expression  Current status: FCM:Level 6  -  CI   Projected status:  FCM:  Level 7  -  CH     Rehab Potential: good    Patient Education/Response:     Word finding strategies were reviewed with pt. Pt verbalized understanding.     Plans and Goals:     Continue POC.  Certification Period: 6/5/17 to 12/31/17  Plan of Care Certification Period: 6/13/17 to 8/11/17      Recommended Treatment Plan:  Patient will participate in the [facility] program for speech therapy 2 times per week to address her  Communication and Motor Speech deficits, to educate patient and their family, and to participate in a home exercise program.    Other Recommendations: none at this time      ARON Peterson, CCC-SLP  7/10/2017

## 2017-07-13 ENCOUNTER — CLINICAL SUPPORT (OUTPATIENT)
Dept: REHABILITATION | Facility: HOSPITAL | Age: 77
End: 2017-07-13
Attending: INTERNAL MEDICINE
Payer: MEDICARE

## 2017-07-13 DIAGNOSIS — Z78.9 IMPAIRED MOBILITY AND ADLS: ICD-10-CM

## 2017-07-13 DIAGNOSIS — I69.322 DYSARTHRIA AS LATE EFFECT OF CEREBROVASCULAR ACCIDENT (CVA): ICD-10-CM

## 2017-07-13 DIAGNOSIS — R47.01 ANOMIC APHASIA: ICD-10-CM

## 2017-07-13 DIAGNOSIS — Z74.09 IMPAIRED MOBILITY AND ADLS: ICD-10-CM

## 2017-07-13 PROCEDURE — G8984 CARRY CURRENT STATUS: HCPCS | Mod: CJ,PO

## 2017-07-13 PROCEDURE — G8985 CARRY GOAL STATUS: HCPCS | Mod: CJ,PO

## 2017-07-13 PROCEDURE — 97110 THERAPEUTIC EXERCISES: CPT | Mod: PO

## 2017-07-13 PROCEDURE — 97530 THERAPEUTIC ACTIVITIES: CPT | Mod: PO

## 2017-07-13 PROCEDURE — 92507 TX SP LANG VOICE COMM INDIV: CPT | Mod: PO

## 2017-07-13 NOTE — PROGRESS NOTES
OUTPATIENT NEUROLOGICAL REHABILITATION  SPEECH THERAPY PROGRESS NOTE    Date:  07/13/2017    Start Time:  1115  Stop Time:  1155    Subjective/History  Onset Date:  February 2017  Primary Diagnosis:  L CVA  Treatment Diagnosis:  Anomic Aphasia, Dysarthria  Referring Provider:  Dr. Garry Jennings  Orders: ST for evaluation and treat  Current Medical History:   Alba Vasquez presents to the [facility] secondary to the diagnosis of L CVA. Pt reports that she was admitted to [facility] for approximately 3 days. Upon discharge, she received home health PT, OT, and ST for approximately 3-4 months  Past Medical History: No past medical history on file.  Precautions:  general        TIMED  Procedure Time Min.    Start:   Stop:       Start:  Stop:     Start:  Stop:     Start:  Stop:          UNTIMED  Procedure Time Min.   Speech and Language Therapy Start:  1115  Stop: 1155  40    Start:  Stop:      Total Minutes: 40  Total Timed Units: 0  Total Untimed Units: 1  Charges Billed/# of units: 1    Visit #: 8  Date of Evaluation:   Plan of Care Expiration:     Extended POC:   G-CODE  8/10    eval  CJ/CI Motor Speech   update            Progress/Current Status    Subjective:     Pain: 0 /10  Pt reports a head cold resulting in hyponasality.    Objective:     Short Term Goals (4 weeks):   1. Pt will name 15-20 items in a concrete category to improve thought organization and word fluency across 3 consecutive sessions.   - pt named an average of 10  Items in a category.    2. Pt will complete word finding tasks with 90% accuracy supervision across 3 consecutive sessions.  - Pt engaged in conversation with moderate word finding difficulty present. Decreased word fluency observed in conversation.  - Word finding strategies were reviewed (i.e. Association, visualization, description, first letter or sound, and subcategorization)    3. Pt will complete verbal expression  tasks with 90% accuracy supervision across 3 consecutive sessions.   - n/a    4. Pt will participate in an assessment of reading and writing. GOAL MET (6/22/17)    5. Pt will complete oral motor exercises 10-20 per session to improve oral musculature strength and ROM.  - OME's were completed with supervision 10x         6. Pt will utilize motor speech strategies (i.e. Overarticulation, slow rate of speech, appropriate breath-speech coordination) in speech tasks with 90% accuracy supervision across 3 consecutive sessions. Goal Met 7/5/17  6a. New Goal: Pt will utilize motor speech strategies (i.e. Overarticulation, slow rate of speech, appropriate breath-speech coordination) in conversation with 90% accuracy supervision across 3 consecutive sessions.   - decreased articulatory precision observed in conversation resulting in 85% intelligiblity.   - nasality increased at rate of speech increased.       Long Term Goals (8 weeks):   1. Pt will develop functional, cognitive-linguistic based skills and utilize compensatory strategies to communicate wants and needs effectively to different conversational partners, maintain safety, and participate socially in functional living environment.   2. Pt will develop functional and intelligible speech and utilize compensatory strategies to communicate effectively in functional living environment.    Assessment:     Mrs. Vasquez presents to [facility] 2/2 a L CVA. She presents with mild anomic aphasia c/b decreased word finding in conversation and when presented with items. Decreased word fluency was observed. She presents with mild-moderate dysarthria c/b imprecise consonants, slow rate of speech, and mild hypernasality. Speech intelligibility is judged to be approximately 75-80% with careful listening.  Patient will benefit from outpatient neurological rehabilitation speech therapy.    Functional Communication Measure (FCM):   Severity Modifier for Medicare  G-Code:  Motor Speech  Current status: FCM: Level 5  - CJ   Projected status:  FCM: Level 6  - CI       Spoken Language Expression  Current status: FCM:Level 6  -  CI   Projected status:  FCM:  Level 7  -  CH     Rehab Potential: good    Patient Education/Response:     POC reviewed with pt. Pt verbalized understanding.    Plans and Goals:     Continue POC.  Certification Period: 6/5/17 to 12/31/17  Plan of Care Certification Period: 6/13/17 to 8/11/17      Recommended Treatment Plan:  Patient will participate in the [facility] program for speech therapy 2 times per week to address her  Communication and Motor Speech deficits, to educate patient and their family, and to participate in a home exercise program.    Other Recommendations: none at this time  ARON Peterson, CCC-SLP  Speech Language Pathologist  7/14/2017

## 2017-07-13 NOTE — PROGRESS NOTES
Reassessment Occupational Therapy  Name: Alba Vasquez  MRN: [id number]   Physician: Garry Bennett MD     Diagnosis:   No diagnosis found.     Date of service: 07/12/17  Start time: 1030  End time: 1115  Pain: 0/10 in UE, Pt reports soreness in L knee     Subjective: Pt reports continued difficulty picking up small items from the ground.    Objective:  Pt reassessed today to address previously stated goals and document any changes in use of R hand during ADL and IADL tasks. Ergometer 5 min forward and 5 min backward to increase ROM and UE endurance. Fine motor coordination activities using R hand to string small beads and screw/unscrew nuts and bolts.    Assessment:    Physical Exam  Fist: able to make fist with B hands, but R hand limited in ROM due to arthritic fingers    Comments: bradykinesia in R hand for fine motor coordination    Joint Evaluation: B UE WFL for ROM    UE Strength: R Good Minus/ L Good - Eval, 7/12/17     Strength(lbs):   Eval: R27.7#  L 52.3#  7/12/17: R 34# L 60#    Fine motor coordination:9 hole peg    Eval: R  54 sec   L 28 sec  7/12/17: R 1:01 sec L 25 sec    Gross motor coordination:Pt continues to demonstrate bradykinesia in R hand with fine motor movements but Pt states improvement since evaluation.    ADL's:  Feeding: Mod I - uses L hand more than R, frustrated with use of R hand, difficulty opening cans( has a manual can opener), (7/12) Pt able to open soda cans  Grooming: Mod I, uses L hand to reach back of head, (7/12) able to use B hand equally to comb hair  Hygiene: Mod I  UB Dressing: Mod I, hook in front and turn around for bra (7/12) still some difficulty but improvement since evaluation  LB Dressing: Mod I   Toileting: Mod I - use of L hand more than R.  Bathing: Mod I - standing, hand rails in shower     IADL's:  Homecare: Mod I - has someone who comes in once a month  Cooking: Mod I - holds heavy pots with L hand, (7/12) no difficulties reported with cooking  tasks  Laundry: Mod I  Yard work: N/A  Use of telephone: Mod I - difficulty with accuracy of touching the screen, (7/12) improved phone use, Pt states she wants to get a phone with bigger screen   Money management: Mod A- daughter help complete bills because Pt unable to sign name. (7/12) Pt able to sign checks and complete bills  Medication management: Mod I  Handwriting: D - unable to sign name on checks (7/12) Handwriting continues to improve, able to write checks    FOTO    CMS Impairment/Limitation/Restriction for FOTO Cerebrovascular Disorders Survey  Status Limitation G-Code CMS Severity Modifier  Intake 56% 44%  Predicted 69% 31% Goal Status+ CJ - At least 20 percent but less than 40 percent  7/13/2017 73% 27% Current Status CJ - At least 20 percent but less than 40 percent    CMS Impairment/Limitation/Restriction for Stroke IS Physical - Hand Function Survey  Status Limitation G-Code CMS Severity Modifier  Intake 60% 40%  7/13/2017 70% 30% Current Status CJ - At least 20 percent but less than 40 percent    Assessment: Ms. Pedro SHANNON  strength has improved by 6# R and 8# L and she reports improved functional use of R hand during ADL and IADL tasks, specifically with feeding, grooming and dressing.  Pt fine motor and pinch skills still demonstrate deficits in R hand that continue to limit her ability to  small objects off of table/ground.  Pt arthritis in R hand is also a limiting factor during fine motor activities.  Pt and therapist discussed the implementation of an additional HEP in future sessions to address previously stated impairments, specifically fine motor deficits, and introduce adaptive strategies before discharge.    PROBLEM LIST/IMPAIRMENTS:   decreased muscle strength, decreased fine and gross motor coordination, and decreased endurance and stamina affecting RUE in ADLs and IADL tasks.    LTG GOALS:  Time frame: 10 weeks  Increase ROM/Strength to perform ADL's and functional activities  for UE dressing and feeding - Progressing  Improve 9 hole peg test by 25 sec with R hand for fastners in dressing - Not Met  Use of RUE 75% of time for feeding tasks - MET, Pt able to use utensils with B hands for cutting and feeding tasks  Legible Signature- MET  Increase R  strength 10# for holding feeding and grooming tools. - Progressing  G-Code: CJ Carry 20-40% impairment - MET    STG Goals:  Time frame: 5 weeks  Improve 9 hole peg test by 10 sec in R hand - Not Met  Use of RUE 50% of time for feeding tasks - MET  I in HEP for increased strength of RUE and improved handwriting - MET  Achieve baseline data for speed and accuracy of texting - MET    New Goals:  I in HEP for increased fine motor skills and adaptive strategies      PLAN:    Patient/caregiver understands and agrees with plan of care.    Outpatient occupational therapy 2  times weekly for 10 weeks  to include: pt ed, hep, therapeutic exercises, therapeutic activity, ADLs,  neuromuscular re-education, joint mobilizations, modalities prn, certification 5/30/17- 8/11/17  Treatment Time: 45 min    MIMI Starks  I certify that I was present in the room directing the student in service delivery and guiding them using my skilled judgment. As the co-signing therapist I have reviewed the students documentation and am responsible for the treatment, assessment, and plan.   Mary Stone OT

## 2017-07-24 ENCOUNTER — CLINICAL SUPPORT (OUTPATIENT)
Dept: REHABILITATION | Facility: HOSPITAL | Age: 77
End: 2017-07-24
Attending: INTERNAL MEDICINE
Payer: MEDICARE

## 2017-07-24 DIAGNOSIS — I69.322 DYSARTHRIA AS LATE EFFECT OF CEREBROVASCULAR ACCIDENT (CVA): ICD-10-CM

## 2017-07-24 DIAGNOSIS — Z74.09 IMPAIRED MOBILITY AND ADLS: ICD-10-CM

## 2017-07-24 DIAGNOSIS — R47.01 ANOMIC APHASIA: ICD-10-CM

## 2017-07-24 DIAGNOSIS — Z78.9 IMPAIRED MOBILITY AND ADLS: ICD-10-CM

## 2017-07-24 PROCEDURE — 97530 THERAPEUTIC ACTIVITIES: CPT | Mod: PO

## 2017-07-24 PROCEDURE — 92507 TX SP LANG VOICE COMM INDIV: CPT | Mod: PO

## 2017-07-24 PROCEDURE — 97110 THERAPEUTIC EXERCISES: CPT | Mod: PO

## 2017-07-24 NOTE — PROGRESS NOTES
OUTPATIENT NEUROLOGICAL REHABILITATION  SPEECH THERAPY PROGRESS NOTE    Date:  07/24/2017    Start Time:  950  Stop Time:  1030    Subjective/History  Onset Date:  February 2017  Primary Diagnosis:  L CVA  Treatment Diagnosis:  Anomic Aphasia, Dysarthria  Referring Provider:  Dr. Garry Jennings  Orders: ST for evaluation and treat  Current Medical History:   Alba Vasquez presents to the [facility] secondary to the diagnosis of L CVA. Pt reports that she was admitted to [facility] for approximately 3 days. Upon discharge, she received home health PT, OT, and ST for approximately 3-4 months  Past Medical History: No past medical history on file.  Precautions:  general        TIMED  Procedure Time Min.    Start:   Stop:       Start:  Stop:     Start:  Stop:     Start:  Stop:          UNTIMED  Procedure Time Min.   Speech and Language Therapy Start:  950  Stop: 1030  40    Start:  Stop:      Total Minutes: 40  Total Timed Units: 0  Total Untimed Units: 1  Charges Billed/# of units: 1    Visit #: 9  Date of Evaluation: 6/13/17  Plan of Care Expiration:   6/13/17 to 8/11/17  Extended POC:   G-CODE  9/10    eval  CJ/CI Motor Speech   update            Progress/Current Status    Subjective:     Pain: 0 /10  Pt reports that her congestion has remained about the same. When SLP suggested pt call MD about persistant congestion, pt reported that she didn't want to take any more medicine.     Objective:     Short Term Goals (4 weeks):   1. Pt will name 15-20 items in a concrete category to improve thought organization and word fluency across 3 consecutive sessions.   - pt named an average of 16  Items in a category. METx1    2. Pt will complete word finding tasks with 90% accuracy supervision across 3 consecutive sessions.  - Pt engaged in conversation with moderate word finding difficulty present. Decreased word fluency observed in conversation. Pt reported that  she utilized description on her trip this past weekend.   - Word finding strategies were reviewed (i.e. Association, visualization, description, first letter or sound, and subcategorization)    3. Pt will complete verbal expression tasks with 90% accuracy supervision across 3 consecutive sessions.   - pt described her trip with minimal word finding deficits. She was observed to utilize gesture and description in conversation successfully.     4. Pt will participate in an assessment of reading and writing. GOAL MET (6/22/17)    5. Pt will complete oral motor exercises 10-20 per session to improve oral musculature strength and ROM.  - OME's were completed with supervision 20x  - Palatal massage was completed x3.          6. Pt will utilize motor speech strategies (i.e. Overarticulation, slow rate of speech, appropriate breath-speech coordination) in speech tasks with 90% accuracy supervision across 3 consecutive sessions. Goal Met 7/5/17  6a. Pt will utilize motor speech strategies (i.e. Overarticulation, slow rate of speech, appropriate breath-speech coordination) in conversation with 90% accuracy supervision across 3 consecutive sessions.   - decreased articulatory precision observed in conversation resulting in 90% intelligiblity. Pt recalled motor speech strategies INDly and utilized them in conversation with ease.    - nasality increased at rate of speech increased.       Long Term Goals (8 weeks):   1. Pt will develop functional, cognitive-linguistic based skills and utilize compensatory strategies to communicate wants and needs effectively to different conversational partners, maintain safety, and participate socially in functional living environment.   2. Pt will develop functional and intelligible speech and utilize compensatory strategies to communicate effectively in functional living environment.    Assessment:     Mrs. Vasquez presents to [facility] 2/2 a L CVA. She presents with  mild anomic aphasia c/b decreased word finding in conversation and when presented with items. Decreased word fluency was observed. She presents with mild-moderate dysarthria c/b imprecise consonants, slow rate of speech, and mild hypernasality. Speech intelligibility is judged to be approximately 75-80% with careful listening.  Patient will benefit from outpatient neurological rehabilitation speech therapy.    Functional Communication Measure (FCM):   Severity Modifier for Medicare G-Code:  Motor Speech  Current status: FCM: Level 5  - CJ   Projected status:  FCM: Level 6  - CI       Spoken Language Expression  Current status: FCM:Level 6  -  CI   Projected status:  FCM:  Level 7  -  CH     Rehab Potential: good    Patient Education/Response:     POC reviewed with pt. Upcoming d/c from ST was discussed. Pt verbalized understanding and agreed to upcoming discharge.     Plans and Goals:     Continue POC.  Certification Period: 6/5/17 to 12/31/17  Plan of Care Certification Period: 6/13/17 to 8/11/17      Recommended Treatment Plan:  Patient will participate in the [facility] program for speech therapy 2 times per week to address her  Communication and Motor Speech deficits, to educate patient and their family, and to participate in a home exercise program.    Other Recommendations: none at this time    ARON Peterson, CCC-SLP  Speech Language Pathologist  7/24/2017

## 2017-07-24 NOTE — PROGRESS NOTES
"              Patient:  Alba [facility] #:  [id number]   Date of Note: 07/24/2017   Referring Physician:  Garry Bennett MD  Diagnosis:    Encounter Diagnosis   Name Primary?    Impaired mobility and ADLs         Start Time: 0900  End Time: 0945   Total Time: 45 min, 30 one on one   Visit: 11  Subjective: Pt reports success with pants buttons this morning and states her feeding tasks are improving, "My cutting has gotten a lot better"   Pain: 0 out of 10 in UE.  Objective: Patient seen by OT this session. Treatment consist of the following therapeutic exercises and activities:  Ergometer 5 min forward and 5 min reverse with level 3 resistance to increase stamina and endurance of B UE.   strengthening using green theraweb for flexion and red rubberband for extensors x 2 min each.  Fine motor coordination and in hand manipulation completing purdue pegboard with R hand and upgraded task for removing items with tweezers.  Pt and therapist discussed adaptive strategies for picking up small items on ground; Pt verbalized and demonstrated understanding of using piece of paper to "scoop" item and then grab with R hand.   Assessment:  Fine motor coordination continue to show deficits as noted by difficulty placing washer and collar onto pegs during activity.  These limitations seem to be due to PMH of arthritis in R hand.  Pt continues to report improvement in daily tasks, including success with fasteners and buttons. Pt verbalized and demonstrated understanding of HEP for improving fine motor coordination.  Pt will continue to benefit from skilled OT intervention.  Patient continues to demonstrate limitation with  PROBLEM LIST/IMPAIRMENTS:   decreased muscle strength, decreased fine and gross motor coordination, and decreased endurance and stamina affecting RUE in ADLs and IADL tasks.     LTG GOALS:  Time frame: 10 weeks  Increase ROM/Strength to perform ADL's and functional activities for UE dressing and feeding - " Progressing  Improve 9 hole peg test by 25 sec with R hand for fastners in dressing - Not Met  Use of RUE 75% of time for feeding tasks - MET, Pt able to use utensils with B hands for cutting and feeding tasks  Legible Signature- MET  Increase R  strength 10# for holding feeding and grooming tools. - Progressing  G-Code: CJ Carry 20-40% impairment - MET     STG Goals:  Time frame: 5 weeks  Improve 9 hole peg test by 10 sec in R hand - Not Met  Use of RUE 50% of time for feeding tasks - MET  I in HEP for increased strength of RUE and improved handwriting - MET  Achieve baseline data for speed and accuracy of texting - MET     New Goals:  I in HEP for increased fine motor skills and adaptive strategies        PLAN:    Patient/caregiver understands and agrees with plan of care.     Outpatient occupational therapy 2  times weekly for 10 weeks  to include: pt ed, hep, therapeutic exercises, therapeutic activity, ADLs,  neuromuscular re-education, joint mobilizations, modalities prn, certification 5/30/17- 8/11/17MIMI Starks  I certify that I was present in the room directing the student in service delivery and guiding them using my skilled judgment. As the co-signing therapist I have reviewed the students documentation and am responsible for the treatment, assessment, and plan.     Mary Stone OT

## 2017-07-27 ENCOUNTER — CLINICAL SUPPORT (OUTPATIENT)
Dept: REHABILITATION | Facility: HOSPITAL | Age: 77
End: 2017-07-27
Attending: INTERNAL MEDICINE
Payer: MEDICARE

## 2017-07-27 DIAGNOSIS — Z74.09 IMPAIRED MOBILITY AND ADLS: ICD-10-CM

## 2017-07-27 DIAGNOSIS — Z78.9 IMPAIRED MOBILITY AND ADLS: ICD-10-CM

## 2017-07-27 DIAGNOSIS — G81.00: Primary | ICD-10-CM

## 2017-07-27 PROCEDURE — 97530 THERAPEUTIC ACTIVITIES: CPT | Mod: PO

## 2017-07-27 PROCEDURE — 97110 THERAPEUTIC EXERCISES: CPT | Mod: PO

## 2017-07-27 NOTE — PROGRESS NOTES
Patient:  Alba Vasquez  [facility] #:  [id number]   Date of Note: 07/27/2017   Referring Physician:  Garry Bennett MD  Diagnosis:    Encounter Diagnoses   Name Primary?    Impaired mobility and ADLs     Flaccid hemiplegia, unspecified etiology, unspecified hemiplegia laterality Yes        Start Time: 0950  End Time: 1035   Total Time: 45 min, 30 one on one   Visit: 12  Subjective: Pt arrived an hour early to session.  Pain: 0 out of 10 in UE.  Objective: Patient seen by OT this session. Treatment consist of the following therapeutic exercises and activities:  Ergometer 5 min forward and 5 min reverse with level 3 resistance to increase stamina and endurance of B UE.   strengthening with yellow/blue band resisted hand griper x 2 min in R hand. Pt maintained prolonged  while performing resisted pronation/supination and flexion/extension movements x 30 each. Fine motor coordination to  large and small coins from flat surface and place in graded target; Mod I sliding paper under coin as adaptation to improve pickup ability.  In hand manipulation to /release marbles 1 x 1. Pt ended session completing coloring fine motor task.  Assessment:  Pt did very well with fine motor activities today.  Pt able to  small and large coins from flat surface with min verbal cues and minimal use of adapted paper strategy.  She seemed very pleased with her progress during this task.  Some difficulties still noted during in hand manipulation tasks for picking up/releasing items 1 x 1. These limitations could be due to PMH of arthritis in R hand.  Pt continues to report improvement in daily tasks. Pt verbalized and demonstrated understanding of HEP for improving fine motor coordination.  Pt will continue to benefit from skilled OT intervention.  Patient continues to demonstrate limitation with  PROBLEM LIST/IMPAIRMENTS:   decreased muscle strength, decreased fine and gross motor coordination, and  decreased endurance and stamina affecting RUE in ADLs and IADL tasks.     LTG GOALS:  Time frame: 10 weeks  Increase ROM/Strength to perform ADL's and functional activities for UE dressing and feeding - Progressing  Improve 9 hole peg test by 25 sec with R hand for fastners in dressing - Not Met  Use of RUE 75% of time for feeding tasks - MET, Pt able to use utensils with B hands for cutting and feeding tasks  Legible Signature- MET  Increase R  strength 10# for holding feeding and grooming tools. - Progressing  G-Code: CJ Carry 20-40% impairment - MET     STG Goals:  Time frame: 5 weeks  Improve 9 hole peg test by 10 sec in R hand - Not Met  Use of RUE 50% of time for feeding tasks - MET  I in HEP for increased strength of RUE and improved handwriting - MET  Achieve baseline data for speed and accuracy of texting - MET     New Goals:  I in HEP for increased fine motor skills and adaptive strategies        PLAN:    Patient/caregiver understands and agrees with plan of care.     Outpatient occupational therapy 2  times weekly for 10 weeks  to include: pt ed, hep, therapeutic exercises, therapeutic activity, ADLs,  neuromuscular re-education, joint mobilizations, modalities prn, certification 5/30/17- 8/11/17EliMIMI Talamantes  I certify that I was present in the room directing the student in service delivery and guiding them using my skilled judgment. As the co-signing therapist I have reviewed the students documentation and am responsible for the treatment, assessment, and plan.     Mary Stone OT

## 2017-07-31 ENCOUNTER — CLINICAL SUPPORT (OUTPATIENT)
Dept: REHABILITATION | Facility: HOSPITAL | Age: 77
End: 2017-07-31
Attending: INTERNAL MEDICINE
Payer: MEDICARE

## 2017-07-31 DIAGNOSIS — I69.322 DYSARTHRIA AS LATE EFFECT OF CEREBROVASCULAR ACCIDENT (CVA): ICD-10-CM

## 2017-07-31 DIAGNOSIS — R47.01 ANOMIC APHASIA: ICD-10-CM

## 2017-07-31 DIAGNOSIS — Z74.09 IMPAIRED MOBILITY AND ADLS: Primary | ICD-10-CM

## 2017-07-31 DIAGNOSIS — Z78.9 IMPAIRED MOBILITY AND ADLS: Primary | ICD-10-CM

## 2017-07-31 PROCEDURE — 97530 THERAPEUTIC ACTIVITIES: CPT | Mod: PO

## 2017-07-31 PROCEDURE — 97110 THERAPEUTIC EXERCISES: CPT | Mod: PO

## 2017-07-31 PROCEDURE — 92507 TX SP LANG VOICE COMM INDIV: CPT | Mod: PO

## 2017-07-31 NOTE — PROGRESS NOTES
Patient:  Alba Vasquez  [facility] #:  [id number]   Date of Note: 07/31/2017   Referring Physician:  Garry Bennett MD  Diagnosis:    Encounter Diagnosis   Name Primary?    Impaired mobility and ADLs Yes        Start Time: 0900  End Time: 945  Total Time: 45 min,   Visit: 13  Subjective: My hand is less stiff.   Pain: 0 out of 10 in UE.  Objective: Patient seen by OT this session. Treatment consist of the following therapeutic exercises and activities:  Ergometer 5 min forward and 5 min reverse with level 4 resistance to increase stamina and endurance of B UE.   strengthening with yellow theraweb x 3 min in R hand. Pt maintained prolonged  while performing resisted pronation/supination with hammer x 3 minutes. Fine motor coordination to manipulate varied size nuts/bolts and BAPs task for grasp.   Assessment:  Pt did very well with fine motor activities today.   She seemed very pleased with her progress during this session.   Some difficulties still noted during in hand manipulation tasks for picking up/releasing small items. These limitations could be due to PMH of arthritis in R hand.  Pt continues to report improvement in daily tasks. Pt verbalized and demonstrated understanding of HEP for improving fine motor coordination.  Pt will continue to benefit from skilled OT intervention.  Patient continues to demonstrate limitation with  PROBLEM LIST/IMPAIRMENTS:   decreased muscle strength, decreased fine and gross motor coordination, and decreased endurance and stamina affecting RUE in ADLs and IADL tasks.     LTG GOALS:  Time frame: 10 weeks  Increase ROM/Strength to perform ADL's and functional activities for UE dressing and feeding - Progressing  Improve 9 hole peg test by 25 sec with R hand for fastners in dressing - Not Met  Use of RUE 75% of time for feeding tasks - MET, Pt able to use utensils with B hands for cutting and feeding tasks  Legible Signature- MET  Increase R  strength 10# for  holding feeding and grooming tools. - Progressing  G-Code: CJ Carry 20-40% impairment - MET     STG Goals:  Time frame: 5 weeks  Improve 9 hole peg test by 10 sec in R hand - Not Met  Use of RUE 50% of time for feeding tasks - MET  I in HEP for increased strength of RUE and improved handwriting - MET  Achieve baseline data for speed and accuracy of texting - MET     New Goals:  I in HEP for increased fine motor skills and adaptive strategies        PLAN:    Patient/caregiver understands and agrees with plan of care.     Outpatient occupational therapy 2  times weekly for 10 weeks  to include: pt ed, hep, therapeutic exercises, therapeutic activity, ADLs,  neuromuscular re-education, joint mobilizations, modalities prn, certification 5/30/17- 8/11/17EliMIMI Talamantes  I certify that I was present in the room directing the student in service delivery and guiding them using my skilled judgment. As the co-signing therapist I have reviewed the students documentation and am responsible for the treatment, assessment, and plan.     Mary Stone OT

## 2017-07-31 NOTE — PROGRESS NOTES
OUTPATIENT NEUROLOGICAL REHABILITATION  SPEECH THERAPY PROGRESS NOTE    Date:  07/31/2017    Start Time:  945  Stop Time:  1030    Subjective/History  Onset Date:  February 2017  Primary Diagnosis:  L CVA  Treatment Diagnosis:  Anomic Aphasia, Dysarthria  Referring Provider:  Dr. Garry Jennings  Orders: ST for evaluation and treat  Current Medical History:   Alba Vasquez presents to the [facility] secondary to the diagnosis of L CVA. Pt reports that she was admitted to [facility] for approximately 3 days. Upon discharge, she received home health PT, OT, and ST for approximately 3-4 months   Past Medical History: No past medical history on file.  Precautions:  general        TIMED  Procedure Time Min.    Start:   Stop:       Start:  Stop:     Start:  Stop:     Start:  Stop:          UNTIMED  Procedure Time Min.   Speech and Language Therapy Start:  945  Stop: 1030  45    Start:  Stop:      Total Minutes: 45  Total Timed Units: 0  Total Untimed Units: 1  Charges Billed/# of units: 1    Visit #: 10  Date of Evaluation: 6/13/17  Plan of Care Expiration:   6/13/17 to 8/11/17  Extended POC:   G-CODE  10/10    eval  CJ/CI Motor Speech   update            Progress/Current Status    Subjective:     Pain: 0 /10  Pt reports that she feels comfortable with d/c ST next session. She reports that she has the tools to progress on her own after upcoming discharge.    Objective:     Short Term Goals (4 weeks):   1. Pt will name 15-20 items in a concrete category to improve thought organization and word fluency across 3 consecutive sessions.   - pt named an average of 10 Items in a category.     2. Pt will complete word finding tasks with 90% accuracy supervision across 3 consecutive sessions.  - Pt engaged in conversation with minimum word finding difficulty present. Decreased word fluency observed in conversation. Pt reported that she utilized description on her  "trip this past weekend.   - Word finding strategies were reviewed (i.e. Association, visualization, description, first letter or sound, and subcategorization)  - pt described two similarities and two differences between objects with min A.    3. Pt will complete verbal expression tasks with 90% accuracy supervision across 3 consecutive sessions.   - pt described her book club with minimal word finding deficits. She was observed to utilize gesture and description in conversation successfully. At one point, she said "before the stroke, I could tell you the name of that book." SLP encouraged pt to utilize word finding strategies. Pt was successful in recalling the name of the book she was looking for.   - pt described two similarities and two differences between objects with min A.    4. Pt will participate in an assessment of reading and writing. GOAL MET (6/22/17)    5. Pt will complete oral motor exercises 10-20 per session to improve oral musculature strength and ROM.  - OME's were completed at home today.      6. Pt will utilize motor speech strategies (i.e. Overarticulation, slow rate of speech, appropriate breath-speech coordination) in speech tasks with 90% accuracy supervision across 3 consecutive sessions. Goal Met 7/5/17  6a. Pt will utilize motor speech strategies (i.e. Overarticulation, slow rate of speech, appropriate breath-speech coordination) in conversation with 90% accuracy supervision across 3 consecutive sessions.   - decreased articulatory precision observed in conversation resulting in 90% intelligiblity. Pt recalled motor speech strategies INDly and utilized them in conversation with ease.  METx2      Long Term Goals (8 weeks):   1. Pt will develop functional, cognitive-linguistic based skills and utilize compensatory strategies to communicate wants and needs effectively to different conversational partners, maintain safety, and participate socially in functional living environment.   2. Pt will " develop functional and intelligible speech and utilize compensatory strategies to communicate effectively in functional living environment.    Assessment:       Mrs. Vasquez presents to [facility] 2/2 a L CVA. She presents with mild anomic aphasia c/b decreased word finding in conversation and when presented with items. Decreased word fluency was observed. She presents with mild-moderate dysarthria c/b imprecise consonants, slow rate of speech, and mild hypernasality. Speech intelligibility is judged to be approximately 75-80% with careful listening.  Patient will benefit from outpatient neurological rehabilitation speech therapy.    Functional Communication Measure (FCM):   Severity Modifier for Medicare G-Code:  Motor Speech  Current status: FCM: Level 5  - CJ   Projected status:  FCM: Level 6  - CI       Spoken Language Expression  Current status: FCM:Level 6  -  CI   Projected status:  FCM:  Level 7  -  CH     Rehab Potential: good    Patient Education/Response:     Carryover activities (i.e. Being an active participant in conversation at book club) were discussed. Pt verbalized understanding.     Plans and Goals:     Continue POC.  Certification Period: 6/5/17 to 12/31/17  Plan of Care Certification Period: 6/13/17 to 8/11/17      Recommended Treatment Plan:  Patient will participate in the [facility] program for speech therapy 2 times per week to address her  Communication and Motor Speech deficits, to educate patient and their family, and to participate in a home exercise program.    Other Recommendations: none at this time    ARON Peterson, CCC-SLP  Speech Language Pathologist  7/31/2017

## 2017-08-03 ENCOUNTER — CLINICAL SUPPORT (OUTPATIENT)
Dept: REHABILITATION | Facility: HOSPITAL | Age: 77
End: 2017-08-03
Attending: INTERNAL MEDICINE
Payer: MEDICARE

## 2017-08-03 DIAGNOSIS — Z74.09 IMPAIRED MOBILITY AND ADLS: ICD-10-CM

## 2017-08-03 DIAGNOSIS — I69.322 DYSARTHRIA AS LATE EFFECT OF CEREBROVASCULAR ACCIDENT (CVA): ICD-10-CM

## 2017-08-03 DIAGNOSIS — R47.01 ANOMIC APHASIA: ICD-10-CM

## 2017-08-03 DIAGNOSIS — Z78.9 IMPAIRED MOBILITY AND ADLS: ICD-10-CM

## 2017-08-03 PROCEDURE — G9186 MOTOR SPEECH GOAL STATUS: HCPCS | Mod: CI,PO

## 2017-08-03 PROCEDURE — G8986 CARRY D/C STATUS: HCPCS | Mod: CI,PO

## 2017-08-03 PROCEDURE — G8985 CARRY GOAL STATUS: HCPCS | Mod: CJ,PO

## 2017-08-03 PROCEDURE — 97110 THERAPEUTIC EXERCISES: CPT | Mod: PO

## 2017-08-03 PROCEDURE — 97112 NEUROMUSCULAR REEDUCATION: CPT | Mod: PO

## 2017-08-03 PROCEDURE — G9158 MOTOR SPEECH D/C STATUS: HCPCS | Mod: CH,PO

## 2017-08-03 PROCEDURE — 92507 TX SP LANG VOICE COMM INDIV: CPT | Mod: PO

## 2017-08-03 NOTE — PROGRESS NOTES
Patient:  Alba Vasquez  [facility] #:  [id number]   Date of Note: 08/03/2017   Referring Physician:  Garry Bennett MD  Diagnosis:    Encounter Diagnosis   Name Primary?    Impaired mobility and ADLs     Initial visit: 5/31/17  Last visit: 8/3/17  Missed visits: 2  Start Time: 1030  End Time:1115  Total Time: 45 min,   Visit: 14  Subjective: My hand is working well.  Pain: 0 out of 10 in UE.  Objective:   ROM WFL with good strength.  35# R. 9 hole peg test R 44 seconds. Gross motor wnl.   CMS Impairment/Limitation/Restriction for FOTO Cerebrovascular Disorders Survey  Status Limitation G-Code CMS Severity Modifier  Intake 56% 44%  Predicted 69% 31% Goal Status+ CJ - At least 20 percent but less than 40 percent  7/13/2017 73% 27%  8/3/2017 77% 23% Current Status CJ - At least 20 percent but less than 40 percent        CMS Impairment/Limitation/Restriction for Stroke IS Physical - Hand Function Survey  Status Limitation G-Code CMS Severity Modifier  Intake 60% 40%  7/13/2017 70% 30%  8/3/2017 90% 10% Current Status CI - At least 1 percent but less than 20 percent  Patient seen by OT this session. Treatment consist of the following therapeutic exercises and activities:  Ergometer 5 min forward and 5 min reverse with level 4 resistance to increase stamina and endurance of B UE.  Red digigrip composite and individual. Upgraded to green putty.   Assessment:  Pt has improved strength and coordination which had helped improve ADL and IADL tasks.     LTG GOALS:  Time frame: 10 weeks  Increase ROM/Strength to perform ADL's and functional activities for UE dressing and feeding - MET  Improve 9 hole peg test by 25 sec with R hand for fastners in dressing - Improved 10 seconds  Use of RUE 75% of time for feeding tasks - MET, Pt able to use utensils with B hands for cutting and feeding tasks  Legible Signature- MET  Increase R  strength 10# for holding feeding and grooming tools. - Improved 8 lbs  G-Code: CJ  Carry 20-40% impairment - MET     STG Goals:  Time frame: 5 weeks  Improve 9 hole peg test by 10 sec in R hand - Met  Use of RUE 50% of time for feeding tasks - MET  I in HEP for increased strength of RUE and improved handwriting - MET  Achieve baseline data for speed and accuracy of texting - MET     New Goals:  I in HEP for increased fine motor skills and adaptive strategies  MET        PLAN:    Patient/caregiver understands and agrees with plan of care. D/C to I HEP. Goals met.           Mary Stone, OT

## 2017-08-03 NOTE — PROGRESS NOTES
OUTPATIENT NEUROLOGICAL REHABILITATION  SPEECH THERAPY DISCHARGE SUMMARY    Date:  08/03/2017    Start Time:  1115  Stop Time:  1145    Subjective/History  Onset Date:  February 2017  Primary Diagnosis:  L CVA  Treatment Diagnosis:  Anomic Aphasia, Dysarthria  Referring Provider:  Dr. Garry Jennings  Orders: ST for evaluation and treat  Current Medical History:   Alba Vasquez presents to the [facility] secondary to the diagnosis of L CVA. Pt reports that she was admitted to [facility] for approximately 3 days. Upon discharge, she received home health PT, OT, and ST for approximately 3-4 months   Past Medical History: No past medical history on file.  Precautions:  general        TIMED  Procedure Time Min.    Start:   Stop:       Start:  Stop:     Start:  Stop:     Start:  Stop:          UNTIMED  Procedure Time Min.   Speech and Language Therapy  Start:  1115  Stop: 1145  30    Start:  Stop:      Total Minutes: 30  Total Timed Units: 0  Total Untimed Units: 1  Charges Billed/# of units: 1    Visit #: 11  Total # Of Visits:  12  Cancelled:  0  No Shows:  1  Date of Evaluation: 6/13/17  Plan of Care Expiration:   6/13/17 to 8/11/17  Extended POC:   G-CODE  11/10    eval  CJ/CI Motor Speech   update              Progress/Current Status    Subjective:     Pain: 0 /10  Pt reports that she feels she is prepared for her discharge from speech therapy.    Objective:   Physical/Functional Status:  At time of discharge, Ms. Vasquez was able to utilize motor speech strategies and word finding strategies in conversation to improve verbal expression.     Short Term Goals (4 weeks):   1. Pt will name 15-20 items in a concrete category to improve thought organization and word fluency across 3 consecutive sessions. Goal Not Met / Discontinue   - pt named an average of 10 Items in a category.     2. Pt will complete word finding tasks with 90% accuracy supervision  across 3 consecutive sessions.Goal Met / Discontinue  - Pt engaged in conversation with minimum word finding difficulty present. Decreased word fluency observed in conversation.   - Word finding strategies were reviewed (i.e. Association, visualization, description, first letter or sound, and subcategorization)    3. Pt will complete verbal expression tasks with 90% accuracy supervision across 3 consecutive sessions. Goal Met / Discontinue  - pt described her book club, fall travel plans, and her family  with minimal word finding deficits. She was observed to utilize gesture and description in conversation successfully.      4. Pt will participate in an assessment of reading and writing. GOAL MET (6/22/17)    5. Pt will complete oral motor exercises 10-20 per session to improve oral musculature strength and ROM. Goal Met / Discontinue  - OME's were completed at home today.      6. Pt will utilize motor speech strategies (i.e. Overarticulation, slow rate of speech, appropriate breath-speech coordination) in speech tasks with 90% accuracy supervision across 3 consecutive sessions. Goal Met 7/5/17  6a. Pt will utilize motor speech strategies (i.e. Overarticulation, slow rate of speech, appropriate breath-speech coordination) in conversation with 90% accuracy supervision across 3 consecutive sessions. Goal Met / Discontinue  - decreased articulatory precision observed in conversation resulting in 90% intelligiblity. Pt recalled motor speech strategies INDly and utilized them in conversation with ease.  METx3      Long Term Goals (8 weeks):   1. Pt will develop functional, cognitive-linguistic based skills and utilize compensatory strategies to communicate wants and needs effectively to different conversational partners, maintain safety, and participate socially in functional living environment. Goal Met / Discontinue  2. Pt will develop functional and intelligible speech and utilize compensatory strategies to  communicate effectively in functional living environment. Goal Met / Discontinue    Assessment:       Mrs. Vasquez presented to [facility] 2/2 a L CVA. She presented with mild anomic aphasia c/b decreased word finding in conversation and when presented with items. Decreased word fluency was observed. She presented with mild-moderate dysarthria c/b imprecise consonants, slow rate of speech, and mild hypernasality. Speech intelligibility wasjudged to be approximately 75-80% with careful listening. Currently, speech intelligibility is between 85-90% intelligible. She successfully utilizes motor speech strategies and word finding strategies in conversation.  Patient no longer warrants skilled speech and language therapy.     Functional Communication Measure (FCM):   Severity Modifier for Medicare G-Code:  Motor Speech  Current status: FCM:  Level 7  - CH   Projected status:  FCM:  Level 6  - CI  Discharge status:  FCM:   Level 7  - CH     Spoken Language Expression  Current status: FCM: Level 7  -  CH   Projected status:  FCM:  Level 7  -  CH   Discharge status:  FCM:  Level 7  -  CH       Rehab Potential: good    Patient Education/Response:     Utilization of word finding strategies in conversation reviewed, carryover activities reviewed, and oral motor exercises reviewed.     Plans and Goals:     D/c ST.     Discharge Plan:  Ms. Vasquez. is being discharged from outpatient neuro rehab speech therapy for the following reason(s):  Patient has reached the maximum rehab potential for the present time      ARON Peterson, CCC-SLP   8/3/2017

## 2017-09-11 PROBLEM — I69.322 DYSARTHRIA AS LATE EFFECT OF CEREBROVASCULAR ACCIDENT (CVA): Status: RESOLVED | Noted: 2017-06-13 | Resolved: 2017-09-11

## 2019-10-30 ENCOUNTER — OFFICE VISIT (OUTPATIENT)
Dept: URGENT CARE | Facility: CLINIC | Age: 79
End: 2019-10-30
Payer: MEDICARE

## 2019-10-30 VITALS
BODY MASS INDEX: 28.93 KG/M2 | SYSTOLIC BLOOD PRESSURE: 149 MMHG | TEMPERATURE: 98 F | OXYGEN SATURATION: 97 % | HEART RATE: 76 BPM | WEIGHT: 180 LBS | RESPIRATION RATE: 17 BRPM | DIASTOLIC BLOOD PRESSURE: 81 MMHG | HEIGHT: 66 IN

## 2019-10-30 DIAGNOSIS — M54.9 BACK PAIN, UNSPECIFIED BACK LOCATION, UNSPECIFIED BACK PAIN LATERALITY, UNSPECIFIED CHRONICITY: Primary | ICD-10-CM

## 2019-10-30 LAB
BILIRUB UR QL STRIP: NEGATIVE
GLUCOSE UR QL STRIP: NEGATIVE
KETONES UR QL STRIP: NEGATIVE
LEUKOCYTE ESTERASE UR QL STRIP: NEGATIVE
PH, POC UA: 5.5 (ref 5–8)
POC BLOOD, URINE: NEGATIVE
POC NITRATES, URINE: NEGATIVE
PROT UR QL STRIP: NEGATIVE
SP GR UR STRIP: 1.02 (ref 1–1.03)
UROBILINOGEN UR STRIP-ACNC: 4 (ref 0.1–1.1)

## 2019-10-30 PROCEDURE — 99204 OFFICE O/P NEW MOD 45 MIN: CPT | Mod: 25,S$GLB,, | Performed by: NURSE PRACTITIONER

## 2019-10-30 PROCEDURE — 87086 URINE CULTURE/COLONY COUNT: CPT

## 2019-10-30 PROCEDURE — 99204 PR OFFICE/OUTPT VISIT, NEW, LEVL IV, 45-59 MIN: ICD-10-PCS | Mod: 25,S$GLB,, | Performed by: NURSE PRACTITIONER

## 2019-10-30 PROCEDURE — 81003 POCT URINALYSIS, DIPSTICK, AUTOMATED, W/O SCOPE: ICD-10-PCS | Mod: QW,S$GLB,, | Performed by: NURSE PRACTITIONER

## 2019-10-30 PROCEDURE — 81003 URINALYSIS AUTO W/O SCOPE: CPT | Mod: QW,S$GLB,, | Performed by: NURSE PRACTITIONER

## 2019-10-30 RX ORDER — ASPIRIN 81 MG/1
81 TABLET ORAL DAILY
COMMUNITY
End: 2023-06-14

## 2019-10-30 RX ORDER — AMLODIPINE BESYLATE 5 MG/1
5 TABLET ORAL DAILY
COMMUNITY
End: 2020-05-06

## 2019-10-30 RX ORDER — OMEGA-3-ACID ETHYL ESTERS 1 G/1
2 CAPSULE, LIQUID FILLED ORAL 2 TIMES DAILY
COMMUNITY
End: 2020-10-12 | Stop reason: SDUPTHER

## 2019-10-30 RX ORDER — LISINOPRIL 40 MG/1
40 TABLET ORAL DAILY
COMMUNITY
End: 2020-05-12 | Stop reason: SDUPTHER

## 2019-10-30 RX ORDER — ATORVASTATIN CALCIUM 40 MG/1
40 TABLET, FILM COATED ORAL DAILY
COMMUNITY
End: 2019-12-11 | Stop reason: SDUPTHER

## 2019-10-30 NOTE — PROGRESS NOTES
"Subjective:       Patient ID: Alba Vasquez is a 79 y.o. female.    Vitals:  height is 5' 5.5" (1.664 m) and weight is 81.6 kg (180 lb). Her oral temperature is 98 °F (36.7 °C). Her blood pressure is 149/81 (abnormal) and her pulse is 76. Her respiration is 17 and oxygen saturation is 97%.     Chief Complaint: Urinary Tract Infection    This is a 79 y.o. female who presents today with a chief complaint of   Back pain, frequent urination, and burning. Pt states sx have been off and on for a few weeks just getting worse    Urinary Tract Infection    This is a new problem. The current episode started 1 to 4 weeks ago. The problem occurs intermittently. The problem has been gradually worsening. The quality of the pain is described as aching and burning. The pain is at a severity of 4/10. The pain is mild. There has been no fever. She is not sexually active. There is no history of pyelonephritis. Associated symptoms include frequency and urgency. Pertinent negatives include no chills, hematuria, nausea, vomiting or rash. She has tried nothing for the symptoms. The treatment provided no relief.       Constitution: Negative for chills and fever.   Neck: Negative for painful lymph nodes.   Gastrointestinal: Negative for abdominal pain, nausea and vomiting.   Genitourinary: Positive for dysuria, frequency and urgency. Negative for urine decreased, hematuria, history of kidney stones, painful menstruation, irregular menstruation, missed menses, heavy menstrual bleeding, ovarian cysts, genital trauma, vaginal pain, vaginal discharge, vaginal bleeding, vaginal odor, painful intercourse, genital sore, painful ejaculation and pelvic pain.   Musculoskeletal: Positive for back pain.   Skin: Negative for rash and lesion.   Hematologic/Lymphatic: Negative for swollen lymph nodes.       Objective:      Physical Exam   Constitutional: She is oriented to person, place, and time. She appears well-developed and well-nourished.  Non-toxic " appearance. She does not have a sickly appearance. She does not appear ill. No distress.   HENT:   Head: Normocephalic and atraumatic.   Right Ear: External ear normal.   Left Ear: External ear normal.   Nose: Nose normal.   Mouth/Throat: Mucous membranes are normal.   Eyes: Conjunctivae and lids are normal.   Neck: Trachea normal and full passive range of motion without pain. Neck supple.   Cardiovascular: Normal rate, regular rhythm and normal heart sounds.   Pulmonary/Chest: Effort normal and breath sounds normal. No respiratory distress.   Abdominal: Soft. Normal appearance and bowel sounds are normal. She exhibits no distension, no abdominal bruit, no pulsatile midline mass and no mass. There is no tenderness. There is no rigidity, no rebound, no guarding, no CVA tenderness and negative White's sign.   Musculoskeletal: Normal range of motion. She exhibits no edema.        Lumbar back: She exhibits tenderness and pain. She exhibits normal range of motion.        Back:    Neurological: She is alert and oriented to person, place, and time. She has normal strength.   Skin: Skin is warm, dry, intact, not diaphoretic, not pale and no rash.   Psychiatric: She has a normal mood and affect. Her speech is normal and behavior is normal. Judgment and thought content normal. Cognition and memory are normal.   Nursing note and vitals reviewed.        Assessment:       1. Back pain, unspecified back location, unspecified back pain laterality, unspecified chronicity        Plan:         Back pain, unspecified back location, unspecified back pain laterality, unspecified chronicity  -     POCT Urinalysis, Dipstick, Automated, W/O Scope  -     Culture, Urine

## 2019-10-30 NOTE — PATIENT INSTRUCTIONS
Return to Urgent Care or go to ER if symptoms worsen or fail to improve.  Follow up with PCP within 2 weeks as recommended for further management.   We will contact you in 3-5 days with your urine culture results.       Flank Pain, Uncertain Cause  The flank is the area between your upper abdomen and your back. Pain there is often caused by a problem with your kidneys. It might be a kidney infection or a kidney stone. Other causes of flank pain include spinal arthritis, a pinched nerve from a back injury, or a back muscle strain or spasm.  The cause of your flank pain is not certain. You may need other tests.  Home care  Follow these tips when caring for yourself at home:  · You may use acetaminophen or ibuprofen to control pain, unless your health care provider prescribed another medicine. If you have chronic liver or kidney disease, talk with your provider before taking these medicines. Also talk with your provider first if youve ever had a stomach ulcer or GI bleeding.  · If the pain is coming from your muscles, you may get relief with ice or heat. During the first 2 days after the injury, put an ice pack on the painful area for 20 minutes every 2 to 4 hours. This will reduce swelling and pain. A hot shower, hot bath, or heating pad works well for a muscle spasm. You can start with ice, then switch to heat after 2 days. You might find that alternating ice and heat works well. Use the method that feels the best to you.  Follow-up care  Follow up with your healthcare provider if your symptoms dont get better over the next few days.  When to seek medical advice  Call your healthcare provider right away if any of these happen:  · Repeated vomiting  · Fever of 100.4ºF (38ºC) or higher, or as directed by your health care provider  · Flank pain that gets worse  · Pain that spreads to the front of your belly (abdomen)  · Dizziness, weakness, or fainting  · Blood in your urine  · Burning feeling when you urinate or the  need to urinate often  · Pain in one of your legs that gets worse  · Numbness or weakness in a leg  Date Last Reviewed: 10/1/2016  © 1055-6832 [employer]. [address] [place] [address], [place] [address]. All rights reserved. This information is not intended as a substitute for professional medical care. Always follow your healthcare professional's instructions.

## 2019-10-31 LAB — BACTERIA UR CULT: NO GROWTH

## 2019-11-05 ENCOUNTER — TELEPHONE (OUTPATIENT)
Dept: URGENT CARE | Facility: CLINIC | Age: 79
End: 2019-11-05

## 2019-11-05 NOTE — TELEPHONE ENCOUNTER
----- Message from Jeanne Singer NP sent at 10/31/2019  7:23 PM CDT -----  Please let patient know that urine culture is negative-- there is no growth.

## 2019-12-11 PROBLEM — I10 HYPERTENSION, ESSENTIAL: Status: ACTIVE | Noted: 2019-12-11

## 2019-12-11 PROBLEM — Z86.73 H/O: CVA (CEREBROVASCULAR ACCIDENT): Status: ACTIVE | Noted: 2019-12-11

## 2019-12-11 PROBLEM — E78.5 HYPERLIPIDEMIA: Status: ACTIVE | Noted: 2019-12-11

## 2021-03-26 ENCOUNTER — PATIENT MESSAGE (OUTPATIENT)
Dept: RESEARCH | Facility: HOSPITAL | Age: 81
End: 2021-03-26

## 2022-09-07 NOTE — PROGRESS NOTES
Physical Therapy Progress Note     Name: Alba Vasquez  [facility] Number: [id number]  Diagnosis:   Encounter Diagnoses   Name Primary?    Dysarthria as late effect of cerebrovascular accident (CVA)     Anomic aphasia     Hemiplegia, unspecified etiology, unspecified hemiplegia laterality, unspecified hemiplegia type     Impaired mobility and ADLs     Abnormality of gait as late effect of cerebrovascular accident (CVA)     Impaired balance as late effect of cerebrovascular accident      Physician: Garry Bennett MD  Treatment Orders: PT Eval and Treat  No past medical history on file.    Evaluation Date: 5/29/17  Visit #: 3  Plan of care expiration: 7/23/17  Precautions: standard; aphasic    G codes 3/10    FOTO On ST caseload         Subjective   Pt reports: no new complaints today. Stated HEP compliant and has began attending gym.    Pain Scale:  None reported    Objective     Patient received individual therapy with activities as follows:     Alba received therapeutic exercises to develop strength and flexibility for 25 minutes including:   Recumbent bike x 10 min, L12 no rest breaks needed. Pt reported medium difficulty.   Heel cord stretches at incline board, 3 x 30 sec B  HS stretches at stairs, 3 x 30 sec B  Standing hip abductions 3 x 10 B, 2 HR NP  HS curls x 10 B - fair posture  Mini squats x 10 - good posture    Patient participated in neuromuscular re-education activities to improve: Balance and Coordination for 20 minutes. The following activities were included:   Tandem static stance x 30 sec B - no loss of balance  Heel raises x 20, no HR  Toe raises, 2 x 20, unable with no HR, needed 2 fingers on bar for balance  Tandem walking, intermittent HR needed with mild LOB, x 3 laps ( 3 half laps of 6 w/o UE sup)  SLS Right  3 Trials 35 sec., 20 sec. 43 sec. No UE support      Written Home Exercises: heel raises, toe raises, HS stretch, SLS, tandem stance,  hip abductions.  (see pt instructions 6/5/17)  Pt demo good understanding of the education provided. Alba demonstrated good return demonstration of activities.     Education provided re: POC, HEP  No spiritual or educational barriers to learning provided    Pt has no cultural, educational or language barriers to learning provided.    Assessment   Pt tolerated session well. Progressing towards goals. Continue tx.         Pt rehab potential is Good. Pt will benefit from continuing skilled outpatient physical therapy to address the deficits listed below in the problem list, provide pt/family education and to maximize pt's level of independence in the home and community environment.      History  Co-morbidities and personal factors that may impact the plan of care Examination  Body Structures and Functions, activity limitations and participation restrictions that may impact the plan of care. Medical necessity is demonstrated by the following impairments. Clinical Presentation Decision Making/ Complexity Score   1. Lives alone, minimal social  1. Impaired gait   2. Impaired balance  3. Impaired coordination  4. L UE impairments  5. aphasia stable     moderate high low low         Pt's spiritual, cultural and educational needs considered and pt agreeable to plan of care and goals as stated below:      GOALS:   Short term goals: 4 weeks, pt agrees to goals set.  1. Pt will improve SLS time on R to at least 10 seconds for decreased fall risk.   2. Pt will improve SLS time on L to at least 10 seconds for decreased fall risk.   3. Pt will improve FGA score to at least 25/30 for increased safety with community ambulation.         Long term goals: 8 weeks, pt agrees to goals set  1. Pt will improve FGA score to at least 28/30 for increased safety with community ambulation.   2. Pt will improve SSWS to at least 1.2m/s for improved community ambulation.   3. Pt will improve FOTO score to at least 66% for improved self  perception of functional mobility.         Plan   Continue PT 1x weekly under established Plan of Care, with treatment to include: pt education, HEP, therapeutic exercises, neuromuscular re-education/balance exercises, therapeutic activities, joint mobilizations, and modalities PRN, to work towards established goals. Pt may be seen by PTA to carry out plan of care.     Ervin Vences, PTA   06/19/2017         [place]

## 2023-11-25 ENCOUNTER — OFFICE VISIT (OUTPATIENT)
Dept: URGENT CARE | Facility: CLINIC | Age: 83
End: 2023-11-25
Payer: MEDICARE

## 2023-11-25 VITALS
HEART RATE: 94 BPM | RESPIRATION RATE: 16 BRPM | OXYGEN SATURATION: 95 % | TEMPERATURE: 98 F | WEIGHT: 189 LBS | BODY MASS INDEX: 30.51 KG/M2 | SYSTOLIC BLOOD PRESSURE: 180 MMHG | DIASTOLIC BLOOD PRESSURE: 95 MMHG

## 2023-11-25 DIAGNOSIS — R94.31 ABNORMAL ELECTROCARDIOGRAM (ECG) (EKG): Primary | ICD-10-CM

## 2023-11-25 DIAGNOSIS — I10 HYPERTENSION, UNSPECIFIED TYPE: ICD-10-CM

## 2023-11-25 PROCEDURE — 93005 ELECTROCARDIOGRAM TRACING: CPT | Mod: S$GLB,,, | Performed by: FAMILY MEDICINE

## 2023-11-25 PROCEDURE — 99202 OFFICE O/P NEW SF 15 MIN: CPT | Mod: S$GLB,,, | Performed by: FAMILY MEDICINE

## 2023-11-25 PROCEDURE — 93010 EKG 12-LEAD: ICD-10-PCS | Mod: S$GLB,,, | Performed by: INTERNAL MEDICINE

## 2023-11-25 PROCEDURE — 93010 ELECTROCARDIOGRAM REPORT: CPT | Mod: S$GLB,,, | Performed by: INTERNAL MEDICINE

## 2023-11-25 PROCEDURE — 93005 EKG 12-LEAD: ICD-10-PCS | Mod: S$GLB,,, | Performed by: FAMILY MEDICINE

## 2023-11-25 PROCEDURE — 99202 PR OFFICE/OUTPT VISIT, NEW, LEVL II, 15-29 MIN: ICD-10-PCS | Mod: S$GLB,,, | Performed by: FAMILY MEDICINE

## 2023-11-25 NOTE — PROGRESS NOTES
Subjective:      Patient ID: Alba Vasquez is a 83 y.o. female.    Vitals:  weight is 85.7 kg (189 lb). Her oral temperature is 97.8 °F (36.6 °C). Her blood pressure is 180/95 (abnormal) and her pulse is 94. Her respiration is 16 and oxygen saturation is 95%.     Chief Complaint: Hypertension    This is a 83 y.o. female who presents today with a chief complaint of elevated b/p today. Pt says she took her b/p today and noticed her b/p was around 190/80. Pt says  she does not have chest pain, no SOB, no shoulder px, no jaw px. Pt has felt lower L arm px (wrist to elbow) x 1 week. Pt has also has had middle back px x 1 week that waxes and wanes. Cardiologist Dr Vallejo.     No recent illnesses.     Home tx:pt has taken routine b/p meds; OTC pain reliever for arm px (does not know which kind); OTC sleep aid (does not know the name)    PMH: CVA; HTN    Hypertension  This is a new problem. The current episode started today. The problem is unchanged. Associated symptoms include anxiety and blurred vision. Pertinent negatives include no chest pain, headaches, malaise/fatigue, neck pain, orthopnea, palpitations or shortness of breath. Hypertensive end-organ damage includes CVA. There is no history of CAD/MI or heart failure.       Neck: Negative for neck pain.   Cardiovascular:  Negative for chest pain and palpitations.   Eyes:  Positive for blurred vision.   Respiratory:  Negative for shortness of breath.    Neurological:  Negative for headaches.      Objective:     Physical Exam   Constitutional: She does not appear ill. No distress. obesity  HENT:   Head: Normocephalic and atraumatic.   Cardiovascular: Normal rate, regular rhythm, normal heart sounds and normal pulses.   Pulmonary/Chest: Effort normal and breath sounds normal.   Abdominal: Normal appearance.   Neurological: She is alert.   Nursing note and vitals reviewed.      Assessment:     1. Abnormal electrocardiogram (ECG) (EKG)    2. Hypertension, unspecified type       No previous EKG to compare, and patient denies hx of CAD or abnormal EKG. Possible new onset LBBB. Will refer to ER for further evaluation and possible management.  Plan:       Abnormal electrocardiogram (ECG) (EKG)  -     Refer to Emergency Dept.    Hypertension, unspecified type  -     IN OFFICE EKG 12-LEAD (to [place])